# Patient Record
Sex: MALE | Race: WHITE | NOT HISPANIC OR LATINO | Employment: OTHER | URBAN - METROPOLITAN AREA
[De-identification: names, ages, dates, MRNs, and addresses within clinical notes are randomized per-mention and may not be internally consistent; named-entity substitution may affect disease eponyms.]

---

## 2017-06-27 ENCOUNTER — HOSPITAL ENCOUNTER (OUTPATIENT)
Dept: FAMILY MEDICINE CLINIC | Facility: CLINIC | Age: 67
Setting detail: SPECIMEN
Discharge: HOME | End: 2017-06-27
Attending: FAMILY MEDICINE | Admitting: FAMILY MEDICINE

## 2017-06-27 LAB
ALBUMIN SERPL-MCNC: 4.2 G/DL (ref 3.5–4.8)
ALBUMIN/GLOB SERPL: 1.4 {RATIO} (ref 1–1.7)
ALP SERPL-CCNC: 51 IU/L (ref 32–91)
ALT SERPL-CCNC: 21 IU/L (ref 17–63)
ANION GAP SERPL CALC-SCNC: 14.2 MMOL/L (ref 10–20)
AST SERPL-CCNC: 25 IU/L (ref 15–41)
BASOPHILS # BLD AUTO: 0 10*3/UL (ref 0–0.2)
BASOPHILS NFR BLD AUTO: 1 % (ref 0–2)
BILIRUB SERPL-MCNC: 1.1 MG/DL (ref 0.3–1.2)
BUN SERPL-MCNC: 18 MG/DL (ref 8–20)
BUN/CREAT SERPL: 18 (ref 6.2–20.3)
CALCIUM SERPL-MCNC: 9.6 MG/DL (ref 8.9–10.3)
CHLORIDE SERPL-SCNC: 103 MMOL/L (ref 101–111)
CHOLEST SERPL-MCNC: 172 MG/DL
CHOLEST/HDLC SERPL: 3.2 {RATIO}
CONV CO2: 28 MMOL/L (ref 22–32)
CONV LDL CHOLESTEROL DIRECT: 103 MG/DL (ref 0–100)
CONV TOTAL PROTEIN: 7.3 G/DL (ref 6.1–7.9)
CREAT UR-MCNC: 1 MG/DL (ref 0.7–1.2)
DIFFERENTIAL METHOD BLD: (no result)
EOSINOPHIL # BLD AUTO: 0.3 10*3/UL (ref 0–0.3)
EOSINOPHIL # BLD AUTO: 4 % (ref 0–3)
ERYTHROCYTE [DISTWIDTH] IN BLOOD BY AUTOMATED COUNT: 13 % (ref 11.5–14.5)
GLOBULIN UR ELPH-MCNC: 3.1 G/DL (ref 2.5–3.8)
GLUCOSE SERPL-MCNC: 136 MG/DL (ref 65–99)
HCT VFR BLD AUTO: 44.7 % (ref 40–54)
HDLC SERPL-MCNC: 53 MG/DL
HGB BLD-MCNC: 15.3 G/DL (ref 14–18)
LDLC/HDLC SERPL: 1.9 {RATIO}
LIPID INTERPRETATION: ABNORMAL
LYMPHOCYTES # BLD AUTO: 1.7 10*3/UL (ref 0.8–4.8)
LYMPHOCYTES NFR BLD AUTO: 26 % (ref 18–42)
MCH RBC QN AUTO: 32.2 PG (ref 26–32)
MCHC RBC AUTO-ENTMCNC: 34.3 G/DL (ref 32–36)
MCV RBC AUTO: 93.9 FL (ref 80–94)
MONOCYTES # BLD AUTO: 0.8 10*3/UL (ref 0.1–1.3)
MONOCYTES NFR BLD AUTO: 11 % (ref 2–11)
NEUTROPHILS # BLD AUTO: 3.9 10*3/UL (ref 2.3–8.6)
NEUTROPHILS NFR BLD AUTO: 58 % (ref 50–75)
NRBC BLD AUTO-RTO: 0 /100{WBCS}
NRBC/RBC NFR BLD MANUAL: 0 10*3/UL
PLATELET # BLD AUTO: 180 10*3/UL (ref 150–450)
PMV BLD AUTO: 9.4 FL (ref 7.4–10.4)
POTASSIUM SERPL-SCNC: 4.2 MMOL/L (ref 3.6–5.1)
RBC # BLD AUTO: 4.76 10*6/UL (ref 4.6–6)
SODIUM SERPL-SCNC: 141 MMOL/L (ref 136–144)
TRIGL SERPL-MCNC: 84 MG/DL
URATE SERPL-MCNC: 6.3 MG/DL (ref 4.8–8.7)
VLDLC SERPL CALC-MCNC: 15.4 MG/DL
WBC # BLD AUTO: 6.8 10*3/UL (ref 4.5–11.5)

## 2018-07-05 ENCOUNTER — HOSPITAL ENCOUNTER (OUTPATIENT)
Dept: FAMILY MEDICINE CLINIC | Facility: CLINIC | Age: 68
Setting detail: SPECIMEN
Discharge: HOME | End: 2018-07-05
Attending: FAMILY MEDICINE | Admitting: FAMILY MEDICINE

## 2018-07-05 LAB
ALBUMIN SERPL-MCNC: 4.2 G/DL (ref 3.5–4.8)
ALBUMIN/GLOB SERPL: 1.4 {RATIO} (ref 1–1.7)
ALP SERPL-CCNC: 62 IU/L (ref 32–91)
ALT SERPL-CCNC: 19 IU/L (ref 17–63)
ANION GAP SERPL CALC-SCNC: 11.2 MMOL/L (ref 10–20)
AST SERPL-CCNC: 25 IU/L (ref 15–41)
BASOPHILS # BLD AUTO: 0 10*3/UL (ref 0–0.2)
BASOPHILS NFR BLD AUTO: 1 % (ref 0–2)
BILIRUB SERPL-MCNC: 1 MG/DL (ref 0.3–1.2)
BUN SERPL-MCNC: 14 MG/DL (ref 8–20)
BUN/CREAT SERPL: 14 (ref 6.2–20.3)
CALCIUM SERPL-MCNC: 9.2 MG/DL (ref 8.9–10.3)
CHLORIDE SERPL-SCNC: 103 MMOL/L (ref 101–111)
CHOLEST SERPL-MCNC: 161 MG/DL
CHOLEST/HDLC SERPL: 3.2 {RATIO}
CONV CO2: 25 MMOL/L (ref 22–32)
CONV LDL CHOLESTEROL DIRECT: 94 MG/DL (ref 0–100)
CONV TOTAL PROTEIN: 7.3 G/DL (ref 6.1–7.9)
CREAT UR-MCNC: 1 MG/DL (ref 0.7–1.2)
DIFFERENTIAL METHOD BLD: (no result)
EOSINOPHIL # BLD AUTO: 0.3 10*3/UL (ref 0–0.3)
EOSINOPHIL # BLD AUTO: 5 % (ref 0–3)
ERYTHROCYTE [DISTWIDTH] IN BLOOD BY AUTOMATED COUNT: 13.2 % (ref 11.5–14.5)
GLOBULIN UR ELPH-MCNC: 3.1 G/DL (ref 2.5–3.8)
GLUCOSE SERPL-MCNC: 186 MG/DL (ref 65–99)
HCT VFR BLD AUTO: 44.9 % (ref 40–54)
HDLC SERPL-MCNC: 50 MG/DL
HGB BLD-MCNC: 15.3 G/DL (ref 14–18)
LDLC/HDLC SERPL: 1.9 {RATIO}
LIPID INTERPRETATION: NORMAL
LYMPHOCYTES # BLD AUTO: 1.3 10*3/UL (ref 0.8–4.8)
LYMPHOCYTES NFR BLD AUTO: 22 % (ref 18–42)
MCH RBC QN AUTO: 31.9 PG (ref 26–32)
MCHC RBC AUTO-ENTMCNC: 34 G/DL (ref 32–36)
MCV RBC AUTO: 93.9 FL (ref 80–94)
MONOCYTES # BLD AUTO: 0.6 10*3/UL (ref 0.1–1.3)
MONOCYTES NFR BLD AUTO: 10 % (ref 2–11)
NEUTROPHILS # BLD AUTO: 3.8 10*3/UL (ref 2.3–8.6)
NEUTROPHILS NFR BLD AUTO: 62 % (ref 50–75)
NRBC BLD AUTO-RTO: 0 /100{WBCS}
NRBC/RBC NFR BLD MANUAL: 0 10*3/UL
PLATELET # BLD AUTO: 214 10*3/UL (ref 150–450)
PMV BLD AUTO: 8.9 FL (ref 7.4–10.4)
POTASSIUM SERPL-SCNC: 4.2 MMOL/L (ref 3.6–5.1)
RBC # BLD AUTO: 4.78 10*6/UL (ref 4.6–6)
SODIUM SERPL-SCNC: 135 MMOL/L (ref 136–144)
TRIGL SERPL-MCNC: 83 MG/DL
URATE SERPL-MCNC: 6.4 MG/DL (ref 4.8–8.7)
VLDLC SERPL CALC-MCNC: 16.5 MG/DL
WBC # BLD AUTO: 6 10*3/UL (ref 4.5–11.5)

## 2019-05-07 ENCOUNTER — HOSPITAL ENCOUNTER (OUTPATIENT)
Dept: CARDIOLOGY | Facility: HOSPITAL | Age: 69
Discharge: HOME | End: 2019-05-07
Attending: INTERNAL MEDICINE | Admitting: INTERNAL MEDICINE

## 2019-05-28 ENCOUNTER — CONVERSION ENCOUNTER (OUTPATIENT)
Dept: FAMILY MEDICINE CLINIC | Facility: CLINIC | Age: 69
End: 2019-05-28

## 2019-06-01 ENCOUNTER — TRANSCRIBE ORDERS (OUTPATIENT)
Dept: CARDIOLOGY | Facility: HOSPITAL | Age: 69
End: 2019-06-01

## 2019-06-01 DIAGNOSIS — I25.10 CVD (CARDIOVASCULAR DISEASE): Primary | ICD-10-CM

## 2019-06-01 DIAGNOSIS — Z95.5 HX OF HEART ARTERY STENT: ICD-10-CM

## 2019-06-04 VITALS
SYSTOLIC BLOOD PRESSURE: 145 MMHG | HEART RATE: 53 BPM | HEIGHT: 72 IN | DIASTOLIC BLOOD PRESSURE: 84 MMHG | BODY MASS INDEX: 28.44 KG/M2 | OXYGEN SATURATION: 98 % | WEIGHT: 210 LBS

## 2019-06-06 NOTE — PROGRESS NOTES
Visit Type:  New Problem  Referring Provider:  Tabitha Brown MD  Primary Provider:  Kevin LITTLE MD      History of Present Illness:  c/o feeling depressed-on and off for many years.   troubled childhood  was able to be successful and good kids, but times of feeling down and easily frustrated      Past Medical History:     Reviewed history from 07/05/2018 and no changes required:        MI-15 yrs ago        Hyperlipidemia        Hyperglycemia        Gout                colonoscopy 2012-nml    Past Surgical History:     Reviewed history from 06/12/2015 and no changes required:        cardiac stent placement 1999        rotator cuff-right        hernia surgery x2        Heart Catherization (2011)    Family History Summary:      Reviewed history Last on 05/07/2019 and no changes required:05/28/2019  Mother - Has Family History of Diabetes - Entered On: 12/11/2017  Mother - Has Family History of Hypertension - Entered On: 12/11/2017    General Comments - FH:  FH Hypertension-mom      Social History:     Reviewed history from 05/07/2019 and no changes required:        Passive Smoke: N        Alcohol Use: Y        Drug Use: N        HIV/High Risk: N        Regular Exercise: Y        Alcohol Use - yes        Drug Use - no        HIV/High Risk - no        Regular Exercise - yes                Smoking History:        Patient is a former smoker.      Active Medications (reviewed today):  PROMETHAZINE HCL 6.25 MG/5ML ORAL SYRUP (PROMETHAZINE HCL) 5ml po q6h prn cough  MULTI VITAMIN/MINERALS ORAL TABLET (MULTIPLE VITAMINS-MINERALS) Take 1 tablet by mouth daily  ASPIRIN EC LO-DOSE 81 MG ORAL TABLET DELAYED RELEASE (ASPIRIN) 1 tab qd  ZOCOR 40 MG ORAL TABLET (SIMVASTATIN) 1 tab qd  ATENOLOL 25 MG ORAL TABLET (ATENOLOL) 1/2 tab qd    Current Allergies (reviewed today):  No known allergies    Current Medications (including medications started today):   ZOLOFT 50 MG ORAL TABLET (SERTRALINE HCL) 1 tab daily  ZYRTEC ALLERGY  10 MG ORAL TABLET (CETIRIZINE HCL) one daily  MULTI VITAMIN/MINERALS ORAL TABLET (MULTIPLE VITAMINS-MINERALS) Take 1 tablet by mouth daily  ASPIRIN EC LO-DOSE 81 MG ORAL TABLET DELAYED RELEASE (ASPIRIN) 1 tab qd  ZOCOR 40 MG ORAL TABLET (SIMVASTATIN) 1 tab qd  ATENOLOL 25 MG ORAL TABLET (ATENOLOL) 1/2 tab qd      Risk Factors:     Smoked Tobacco Use:  Former smoker  Drug use:  no  HIV high-risk behavior:  no  Alcohol use:  yes  Exercise:  yes        Vital Signs:    Patient Profile:    68 Years Old Male  Height:     71.5 inches  Weight:     210 pounds  BMI:        28.88     O2 Sat:     98 %  Pulse rate: 53 / minute  BP Sittin / 84  (left arm)    Cuff size:  regular    Medications: Medications were reviewed with the patient during this visit.  Allergies: Allergies were reviewed with the patient during this visit.  No Known Allergy.        Vitals Entered By: Corina TAVARES (May 28, 2019 10:07 AM)        Blood Pressure:  Today's BP: 145/84 mm Hg    Labwork:   Most Recent Lab Results:   LDL: 94 mg/dL 2018  HbA1c: : 6.1 % 2018        Impression & Recommendations:    Problem # 1:  DEPRESSIVE DISORDER NEC (ICD-311) (NGB03-I74.9)  start meds  d/w pt goals and side effects  20 of 25min spent discussing pt medical concerns and treatment      His updated medication list for this problem includes:     Zoloft 50 Mg Oral Tablet (Sertraline hcl) ..... 1 tab daily      Medications Added to Medication List This Visit:  1)  Zoloft 50 Mg Oral Tablet (Sertraline hcl) .... 1 tab daily  2)  Zyrtec Allergy 10 Mg Oral Tablet (Cetirizine hcl) .... One daily      Patient Instructions:  1)  follow up july for wellness                        Medication Administration    Orders Added:  1)  98282-Krp Vst-Est Level IV [CPT-65884]  ]      Electronically signed by Tabitha Brown MD on 2019 at 10:27 AM  ________________________________________________________________________       Disclaimer: Converted Note message may  not contain all data elements that existed in the legacy source system. Please see [employer] for the original note details.

## 2019-07-17 ENCOUNTER — LAB (OUTPATIENT)
Dept: FAMILY MEDICINE CLINIC | Facility: CLINIC | Age: 69
End: 2019-07-17

## 2019-07-17 ENCOUNTER — OFFICE VISIT (OUTPATIENT)
Dept: FAMILY MEDICINE CLINIC | Facility: CLINIC | Age: 69
End: 2019-07-17

## 2019-07-17 VITALS
HEIGHT: 72 IN | DIASTOLIC BLOOD PRESSURE: 75 MMHG | SYSTOLIC BLOOD PRESSURE: 135 MMHG | BODY MASS INDEX: 28.44 KG/M2 | WEIGHT: 210 LBS | OXYGEN SATURATION: 94 % | HEART RATE: 50 BPM

## 2019-07-17 DIAGNOSIS — E78.5 HYPERLIPIDEMIA, UNSPECIFIED HYPERLIPIDEMIA TYPE: ICD-10-CM

## 2019-07-17 DIAGNOSIS — R73.03 PREDIABETES: Primary | ICD-10-CM

## 2019-07-17 DIAGNOSIS — R73.03 PREDIABETES: ICD-10-CM

## 2019-07-17 DIAGNOSIS — Z00.00 WELL ADULT EXAM: ICD-10-CM

## 2019-07-17 LAB
ALBUMIN SERPL-MCNC: 4.4 G/DL (ref 3.5–4.8)
ALBUMIN/GLOB SERPL: 1.4 G/DL (ref 1–1.7)
ALP SERPL-CCNC: 61 U/L (ref 32–91)
ALT SERPL W P-5'-P-CCNC: 19 U/L (ref 17–63)
ANION GAP SERPL CALCULATED.3IONS-SCNC: 13.8 MMOL/L (ref 5–15)
ARTICHOKE IGE QN: 117 MG/DL (ref 0–100)
AST SERPL-CCNC: 20 U/L (ref 15–41)
BASOPHILS # BLD AUTO: 0.1 10*3/MM3 (ref 0–0.2)
BASOPHILS NFR BLD AUTO: 0.9 % (ref 0–1.5)
BILIRUB SERPL-MCNC: 0.9 MG/DL (ref 0.3–1.2)
BUN BLD-MCNC: 14 MG/DL (ref 8–20)
BUN/CREAT SERPL: 14 (ref 6.2–20.3)
CALCIUM SPEC-SCNC: 9.4 MG/DL (ref 8.9–10.3)
CHLORIDE SERPL-SCNC: 104 MMOL/L (ref 101–111)
CHOLEST SERPL-MCNC: 179 MG/DL
CO2 SERPL-SCNC: 25 MMOL/L (ref 22–32)
CREAT BLD-MCNC: 1 MG/DL (ref 0.7–1.2)
DEPRECATED RDW RBC AUTO: 44.2 FL (ref 37–54)
EOSINOPHIL # BLD AUTO: 0.4 10*3/MM3 (ref 0–0.4)
EOSINOPHIL NFR BLD AUTO: 6.4 % (ref 0.3–6.2)
ERYTHROCYTE [DISTWIDTH] IN BLOOD BY AUTOMATED COUNT: 13.3 % (ref 12.3–15.4)
GFR SERPL CREATININE-BSD FRML MDRD: 74 ML/MIN/1.73
GLOBULIN UR ELPH-MCNC: 3.1 GM/DL (ref 2.5–3.8)
GLUCOSE BLD-MCNC: 140 MG/DL (ref 65–99)
HBA1C MFR BLD: 6.3 % (ref 3.5–5.6)
HCT VFR BLD AUTO: 46.4 % (ref 37.5–51)
HDLC SERPL QL: 3.31
HDLC SERPL-MCNC: 54 MG/DL
HGB BLD-MCNC: 15.9 G/DL (ref 13–17.7)
LDLC/HDLC SERPL: 1.93 {RATIO}
LYMPHOCYTES # BLD AUTO: 2.1 10*3/MM3 (ref 0.7–3.1)
LYMPHOCYTES NFR BLD AUTO: 30.7 % (ref 19.6–45.3)
MCH RBC QN AUTO: 32.5 PG (ref 26.6–33)
MCHC RBC AUTO-ENTMCNC: 34.3 G/DL (ref 31.5–35.7)
MCV RBC AUTO: 94.9 FL (ref 79–97)
MONOCYTES # BLD AUTO: 0.7 10*3/MM3 (ref 0.1–0.9)
MONOCYTES NFR BLD AUTO: 9.7 % (ref 5–12)
NEUTROPHILS # BLD AUTO: 3.5 10*3/MM3 (ref 1.7–7)
NEUTROPHILS NFR BLD AUTO: 52.3 % (ref 42.7–76)
NRBC BLD AUTO-RTO: 0.2 /100 WBC (ref 0–0.2)
PLATELET # BLD AUTO: 224 10*3/MM3 (ref 140–450)
PMV BLD AUTO: 9.1 FL (ref 6–12)
POTASSIUM BLD-SCNC: 4.8 MMOL/L (ref 3.6–5.1)
PROT SERPL-MCNC: 7.5 G/DL (ref 6.1–7.9)
RBC # BLD AUTO: 4.89 10*6/MM3 (ref 4.14–5.8)
SODIUM BLD-SCNC: 138 MMOL/L (ref 136–144)
TRIGL SERPL-MCNC: 103 MG/DL
VLDLC SERPL-MCNC: 20.6 MG/DL
WBC NRBC COR # BLD: 6.7 10*3/MM3 (ref 3.4–10.8)

## 2019-07-17 PROCEDURE — 85025 COMPLETE CBC W/AUTO DIFF WBC: CPT | Performed by: FAMILY MEDICINE

## 2019-07-17 PROCEDURE — 80061 LIPID PANEL: CPT | Performed by: FAMILY MEDICINE

## 2019-07-17 PROCEDURE — 80053 COMPREHEN METABOLIC PANEL: CPT | Performed by: FAMILY MEDICINE

## 2019-07-17 PROCEDURE — 36415 COLL VENOUS BLD VENIPUNCTURE: CPT | Performed by: FAMILY MEDICINE

## 2019-07-17 PROCEDURE — 83036 HEMOGLOBIN GLYCOSYLATED A1C: CPT | Performed by: FAMILY MEDICINE

## 2019-07-17 PROCEDURE — 99213 OFFICE O/P EST LOW 20 MIN: CPT | Performed by: FAMILY MEDICINE

## 2019-07-17 RX ORDER — SIMVASTATIN 40 MG
40 TABLET ORAL NIGHTLY
Qty: 90 TABLET | Refills: 3 | Status: SHIPPED | OUTPATIENT
Start: 2019-07-17 | End: 2020-07-27 | Stop reason: SDUPTHER

## 2019-07-17 RX ORDER — ASPIRIN 81 MG/1
81 TABLET ORAL DAILY
COMMUNITY
Start: 2015-01-22

## 2019-07-17 RX ORDER — SIMVASTATIN 40 MG
TABLET ORAL
COMMUNITY
Start: 2015-01-22 | End: 2019-07-17 | Stop reason: SDUPTHER

## 2019-07-17 RX ORDER — ATENOLOL 25 MG/1
TABLET ORAL
COMMUNITY
Start: 2015-01-22 | End: 2019-07-17 | Stop reason: SDUPTHER

## 2019-07-17 RX ORDER — ATENOLOL 25 MG/1
25 TABLET ORAL DAILY
Qty: 90 TABLET | Refills: 3 | Status: SHIPPED | OUTPATIENT
Start: 2019-07-17 | End: 2020-07-27 | Stop reason: SDUPTHER

## 2019-07-17 RX ORDER — CETIRIZINE HYDROCHLORIDE 10 MG/1
TABLET ORAL EVERY 24 HOURS
COMMUNITY
Start: 2019-05-28

## 2019-07-17 NOTE — PROGRESS NOTES
Subjective   Edi Hager is a 69 y.o. male.     HPI     Here for medication f/u-zoloft-feeling great  Labs    wal porfirio      Past Medical History:     Reviewed history from 07/05/2018 and no changes required:        MI-15 yrs ago        Hyperlipidemia        Hyperglycemia        Gout                colonoscopy 2012-nml    Past Surgical History:     Reviewed history from 06/12/2015 and no changes required:        cardiac stent placement 1999        rotator cuff-right        hernia surgery x2        Heart Catherization (2011)      Past Medical History:   Diagnosis Date   • Gout    • Hx of colonoscopy 2012    NML   • Hyperglycemia    • Hyperlipidemia    • MI (myocardial infarction) (CMS/HCC)     15 years ago      Past Surgical History:   Procedure Laterality Date   • CARDIAC CATHETERIZATION  20111   • CORONARY ANGIOPLASTY WITH STENT PLACEMENT  1999   • HERNIA REPAIR      Hernia surgery (Two surgery)   • ROTATOR CUFF REPAIR Right      Family History   Problem Relation Age of Onset   • Hypertension Mother    • Diabetes Mother      Social History     Tobacco Use   • Smoking status: Former Smoker   • Smokeless tobacco: Never Used   Substance Use Topics   • Alcohol use: Yes       Current Outpatient Medications on File Prior to Visit   Medication Sig Dispense Refill   • aspirin (ASPIR) 81 MG EC tablet ASPIRIN EC LO-DOSE 81 MG ORAL TABLET DELAYED RELEASE     • cetirizine (ZYRTEC ALLERGY) 10 MG tablet Daily.     • Multiple Vitamins-Minerals (MULTI VITAMIN/MINERALS) tablet MULTI VITAMIN/MINERALS TABS     • [DISCONTINUED] atenolol (TENORMIN) 25 MG tablet ATENOLOL 25 MG TABS     • [DISCONTINUED] sertraline (ZOLOFT) 50 MG tablet Daily.     • [DISCONTINUED] simvastatin (ZOCOR) 40 MG tablet ZOCOR 40 MG TABS       No current facility-administered medications on file prior to visit.            Review of Systems   Respiratory: Negative for shortness of breath.    Cardiovascular: Negative for chest pain.   Gastrointestinal:  "Negative for constipation and diarrhea.   Genitourinary: Negative for dysuria.       Visit Vitals  /75 (BP Location: Left arm, Patient Position: Sitting, Cuff Size: Adult)   Pulse 50   Ht 182.9 cm (72\")   Wt 95.3 kg (210 lb)   SpO2 94%   BMI 28.48 kg/m²       Objective   Physical Exam   Constitutional: He is oriented to person, place, and time. He appears well-developed and well-nourished.   HENT:   Head: Normocephalic and atraumatic.   Cardiovascular: Normal rate, regular rhythm and normal heart sounds.   Pulmonary/Chest: Effort normal and breath sounds normal.   Neurological: He is alert and oriented to person, place, and time.   Psychiatric: He has a normal mood and affect. His behavior is normal. Judgment and thought content normal.   Vitals reviewed.        Diagnoses and all orders for this visit:    1. Prediabetes (Primary)  -     CBC & Differential  -     Comprehensive Metabolic Panel; Future  -     Hemoglobin A1c; Future  -     Lipid Panel; Future    2. Well adult exam  -     CBC & Differential  -     Comprehensive Metabolic Panel; Future  -     Hemoglobin A1c; Future  -     Lipid Panel; Future    3. Hyperlipidemia, unspecified hyperlipidemia type   -     Lipid Panel; Future    Other orders  -     atenolol (TENORMIN) 25 MG tablet; Take 1 tablet by mouth Daily.  Dispense: 90 tablet; Refill: 3  -     sertraline (ZOLOFT) 50 MG tablet; Take 1 tablet by mouth Daily.  Dispense: 90 tablet; Refill: 3  -     simvastatin (ZOCOR) 40 MG tablet; Take 1 tablet by mouth Every Night.  Dispense: 90 tablet; Refill: 3      "

## 2019-11-05 ENCOUNTER — APPOINTMENT (OUTPATIENT)
Dept: CARDIOLOGY | Facility: HOSPITAL | Age: 69
End: 2019-11-05

## 2020-01-24 ENCOUNTER — OFFICE VISIT (OUTPATIENT)
Dept: FAMILY MEDICINE CLINIC | Facility: CLINIC | Age: 70
End: 2020-01-24

## 2020-01-24 VITALS
WEIGHT: 212 LBS | TEMPERATURE: 98.1 F | OXYGEN SATURATION: 98 % | DIASTOLIC BLOOD PRESSURE: 72 MMHG | SYSTOLIC BLOOD PRESSURE: 123 MMHG | BODY MASS INDEX: 28.75 KG/M2 | HEART RATE: 54 BPM

## 2020-01-24 DIAGNOSIS — J40 BRONCHITIS: Primary | ICD-10-CM

## 2020-01-24 PROCEDURE — 99213 OFFICE O/P EST LOW 20 MIN: CPT | Performed by: NURSE PRACTITIONER

## 2020-01-24 RX ORDER — METHYLPREDNISOLONE 4 MG/1
TABLET ORAL
Qty: 21 TABLET | Refills: 0 | Status: SHIPPED | OUTPATIENT
Start: 2020-01-24 | End: 2020-02-28

## 2020-01-24 RX ORDER — AMOXICILLIN AND CLAVULANATE POTASSIUM 875; 125 MG/1; MG/1
1 TABLET, FILM COATED ORAL 2 TIMES DAILY
Qty: 20 TABLET | Refills: 0 | Status: SHIPPED | OUTPATIENT
Start: 2020-01-24 | End: 2020-02-03

## 2020-01-24 NOTE — PROGRESS NOTES
Subjective   Edi Hager is a 69 y.o. male.       HPI   Pt. Is  Here today with c/o a URI.  Symptoms started about a week ago.  He has noticed some tightness in his chest with wheezes; increase with physical activity.  Mild congestion. Denies any sore throat, ear pain or fevers.  Hasn't taken anything otc.  No known ill contacts.    Denies any chest pain or palpitations.     The following portions of the patient's history were reviewed and updated as appropriate: allergies, current medications, past family history, past medical history, past social history, past surgical history and problem list.    Review of Systems   Constitutional: Negative for activity change, appetite change, chills, diaphoresis, fatigue, fever, unexpected weight gain and unexpected weight loss.   HENT: Positive for congestion. Negative for ear discharge, ear pain, postnasal drip, rhinorrhea, sinus pressure, sore throat, swollen glands and trouble swallowing.    Eyes: Negative for pain, discharge, redness and itching.   Respiratory: Positive for cough, shortness of breath and wheezing.    Cardiovascular: Negative for chest pain, palpitations and leg swelling.   Gastrointestinal: Negative for constipation, diarrhea, nausea and vomiting.   Musculoskeletal: Negative for arthralgias and myalgias.   Skin: Negative for rash.   Neurological: Negative for dizziness and headache.   Psychiatric/Behavioral: Negative for depressed mood. The patient is not nervous/anxious.        Objective   Physical Exam   Constitutional: He is oriented to person, place, and time. He appears well-developed and well-nourished. No distress.   HENT:   Head: Normocephalic and atraumatic.   Right Ear: Hearing, tympanic membrane, external ear and ear canal normal.   Left Ear: Hearing, tympanic membrane, external ear and ear canal normal.   Nose: Rhinorrhea present. Right sinus exhibits no maxillary sinus tenderness and no frontal sinus tenderness. Left sinus exhibits no  maxillary sinus tenderness and no frontal sinus tenderness.   Mouth/Throat: Uvula is midline, oropharynx is clear and moist and mucous membranes are normal.   Eyes: Pupils are equal, round, and reactive to light. Conjunctivae and EOM are normal. Right eye exhibits no discharge. Left eye exhibits no discharge.   Neck: Normal range of motion. Neck supple.   Cardiovascular: Normal rate, regular rhythm, normal heart sounds and intact distal pulses.   No murmur heard.  Pulmonary/Chest: Effort normal. No respiratory distress. He has wheezes (a few scattered expiratory wheezes throughout; clears well with cough. ).   Abdominal: Soft. Bowel sounds are normal. He exhibits no distension. There is no tenderness.   Musculoskeletal: He exhibits no edema.   Lymphadenopathy:     He has no cervical adenopathy.   Neurological: He is alert and oriented to person, place, and time.   Skin: Skin is warm and dry. No rash noted. He is not diaphoretic.   Psychiatric: He has a normal mood and affect.   Vitals reviewed.        Assessment/Plan   Edi was seen today for uri.    Diagnoses and all orders for this visit:    Bronchitis  Comments:  Given Augmentin and medrol pack  Increase fluids and rest  Call for worsening    Orders:  -     methylPREDNISolone (MEDROL, JO ANN,) 4 MG tablet; Take as directed on package instructions.  -     amoxicillin-clavulanate (AUGMENTIN) 875-125 MG per tablet; Take 1 tablet by mouth 2 (Two) Times a Day for 10 days.

## 2020-02-28 ENCOUNTER — OFFICE VISIT (OUTPATIENT)
Dept: FAMILY MEDICINE CLINIC | Facility: CLINIC | Age: 70
End: 2020-02-28

## 2020-02-28 VITALS
OXYGEN SATURATION: 98 % | BODY MASS INDEX: 28.62 KG/M2 | HEART RATE: 65 BPM | SYSTOLIC BLOOD PRESSURE: 148 MMHG | DIASTOLIC BLOOD PRESSURE: 87 MMHG | WEIGHT: 211 LBS | TEMPERATURE: 97.7 F

## 2020-02-28 DIAGNOSIS — J06.9 ACUTE URI: Primary | ICD-10-CM

## 2020-02-28 PROCEDURE — 99213 OFFICE O/P EST LOW 20 MIN: CPT | Performed by: NURSE PRACTITIONER

## 2020-02-28 RX ORDER — CEFDINIR 300 MG/1
300 CAPSULE ORAL 2 TIMES DAILY
Qty: 20 CAPSULE | Refills: 0 | Status: SHIPPED | OUTPATIENT
Start: 2020-02-28 | End: 2020-03-09

## 2020-02-28 NOTE — PROGRESS NOTES
Subjective   Edi Hgaer is a 69 y.o. male.       HPI   Pt. is here today with concerns of a URI.  Pt. seen on 1/24 for bronchitis; treated with augmentin and medrol pack.  He feels symptoms completely resolved.  He started to notice some symptoms returning earlier this week.  He has felt nasal and chest congestion.  Some wheezes but not currently.  No fevers.  No ear pain or sore throat.  Grandchild has recently been sick.  He has cetrizine at home but doesn't use it regularly right now.     The following portions of the patient's history were reviewed and updated as appropriate: allergies, current medications, past family history, past medical history, past social history, past surgical history and problem list.    Review of Systems   Constitutional: Negative for activity change, appetite change, chills, diaphoresis, fatigue, fever, unexpected weight gain and unexpected weight loss.   HENT: Positive for congestion and postnasal drip. Negative for ear discharge, ear pain, rhinorrhea, sinus pressure, sore throat, swollen glands and trouble swallowing.    Eyes: Negative for pain, discharge, redness and itching.   Respiratory: Positive for cough. Negative for chest tightness, shortness of breath and wheezing.    Cardiovascular: Negative for chest pain, palpitations and leg swelling.   Gastrointestinal: Negative for diarrhea, nausea and vomiting.   Musculoskeletal: Negative for arthralgias and myalgias.   Skin: Negative for rash and skin lesions.   Neurological: Negative for dizziness, light-headedness, headache and confusion.   Hematological: Negative for adenopathy.   Psychiatric/Behavioral: Negative for depressed mood. The patient is not nervous/anxious.        Objective   Physical Exam   Constitutional: He is oriented to person, place, and time. He appears well-developed and well-nourished. No distress.   HENT:   Head: Normocephalic and atraumatic.   Right Ear: Hearing, tympanic membrane, external ear and ear  canal normal.   Left Ear: Hearing, tympanic membrane, external ear and ear canal normal.   Nose: Rhinorrhea present. Right sinus exhibits no maxillary sinus tenderness and no frontal sinus tenderness. Left sinus exhibits no maxillary sinus tenderness and no frontal sinus tenderness.   Mouth/Throat: Uvula is midline and mucous membranes are normal. Posterior oropharyngeal erythema present. No oropharyngeal exudate, posterior oropharyngeal edema or tonsillar abscesses.   Eyes: Pupils are equal, round, and reactive to light. Conjunctivae and EOM are normal. Right eye exhibits no discharge. Left eye exhibits no discharge.   Neck: Normal range of motion. Neck supple.   Cardiovascular: Normal rate, regular rhythm, normal heart sounds and intact distal pulses.   No murmur heard.  Pulmonary/Chest: Effort normal and breath sounds normal. No respiratory distress. He has no wheezes. He exhibits no tenderness.   Abdominal: Soft. Bowel sounds are normal. He exhibits no distension. There is no tenderness.   Lymphadenopathy:     He has no cervical adenopathy.   Neurological: He is alert and oriented to person, place, and time.   Skin: Skin is warm and dry. No rash noted. No erythema.   Psychiatric: He has a normal mood and affect.   Vitals reviewed.        Assessment/Plan   Edi was seen today for bronchitis.    Diagnoses and all orders for this visit:    Acute URI  Comments:  Given cefdinir  Start certrizine and flonase daily  Increase fluids and rest  Call for worsening  Orders:  -     cefdinir (OMNICEF) 300 MG capsule; Take 1 capsule by mouth 2 (Two) Times a Day for 10 days.

## 2020-05-14 ENCOUNTER — HOSPITAL ENCOUNTER (OUTPATIENT)
Dept: CARDIOLOGY | Facility: HOSPITAL | Age: 70
Discharge: HOME | End: 2020-05-14

## 2020-05-14 ENCOUNTER — OFFICE VISIT (OUTPATIENT)
Dept: CARDIOLOGY | Facility: CLINIC | Age: 70
End: 2020-05-14

## 2020-05-14 VITALS
HEIGHT: 72 IN | BODY MASS INDEX: 28.58 KG/M2 | DIASTOLIC BLOOD PRESSURE: 80 MMHG | SYSTOLIC BLOOD PRESSURE: 140 MMHG | WEIGHT: 211 LBS | HEART RATE: 52 BPM

## 2020-05-14 DIAGNOSIS — I20.9 ANGINA, CLASS II (HCC): ICD-10-CM

## 2020-05-14 DIAGNOSIS — I25.10 CVD (CARDIOVASCULAR DISEASE): ICD-10-CM

## 2020-05-14 DIAGNOSIS — R94.39 ABNORMAL NUCLEAR STRESS TEST: Primary | ICD-10-CM

## 2020-05-14 DIAGNOSIS — Z95.820 STATUS POST ANGIOPLASTY WITH STENT: ICD-10-CM

## 2020-05-14 DIAGNOSIS — Z95.5 HX OF HEART ARTERY STENT: ICD-10-CM

## 2020-05-14 LAB
BH CV STRESS BP STAGE 1: NORMAL
BH CV STRESS BP STAGE 2: NORMAL
BH CV STRESS BP STAGE 3: NORMAL
BH CV STRESS DURATION MIN STAGE 1: 3
BH CV STRESS DURATION MIN STAGE 2: 3
BH CV STRESS DURATION MIN STAGE 3: 1
BH CV STRESS DURATION SEC STAGE 1: 0
BH CV STRESS DURATION SEC STAGE 2: 0
BH CV STRESS DURATION SEC STAGE 3: 38
BH CV STRESS GRADE STAGE 1: 10
BH CV STRESS GRADE STAGE 2: 12
BH CV STRESS GRADE STAGE 3: 14
BH CV STRESS HR STAGE 1: 73
BH CV STRESS HR STAGE 2: 82
BH CV STRESS HR STAGE 3: 112
BH CV STRESS METS STAGE 1: 5
BH CV STRESS METS STAGE 2: 7.5
BH CV STRESS METS STAGE 3: 10
BH CV STRESS PROTOCOL 1: NORMAL
BH CV STRESS RECOVERY BP: NORMAL MMHG
BH CV STRESS RECOVERY HR: 63 BPM
BH CV STRESS SPEED STAGE 1: 1.7
BH CV STRESS SPEED STAGE 2: 2.5
BH CV STRESS SPEED STAGE 3: 3.4
BH CV STRESS STAGE 1: 1
BH CV STRESS STAGE 2: 2
BH CV STRESS STAGE 3: 3
MAXIMAL PREDICTED HEART RATE: 152 BPM
PERCENT MAX PREDICTED HR: 73.68 %
STRESS BASELINE BP: NORMAL MMHG
STRESS BASELINE HR: 52 BPM
STRESS PERCENT HR: 87 %
STRESS POST ESTIMATED WORKLOAD: 9.1 METS
STRESS POST EXERCISE DUR MIN: 7 MIN
STRESS POST EXERCISE DUR SEC: 38 SEC
STRESS POST PEAK BP: NORMAL MMHG
STRESS POST PEAK HR: 112 BPM
STRESS TARGET HR: 129 BPM

## 2020-05-14 PROCEDURE — 93017 CV STRESS TEST TRACING ONLY: CPT

## 2020-05-14 PROCEDURE — 93016 CV STRESS TEST SUPVJ ONLY: CPT | Performed by: INTERNAL MEDICINE

## 2020-05-14 PROCEDURE — 0 TECHNETIUM SESTAMIBI: Performed by: INTERNAL MEDICINE

## 2020-05-14 PROCEDURE — 78452 HT MUSCLE IMAGE SPECT MULT: CPT | Performed by: INTERNAL MEDICINE

## 2020-05-14 PROCEDURE — A9500 TC99M SESTAMIBI: HCPCS | Performed by: INTERNAL MEDICINE

## 2020-05-14 PROCEDURE — 99215 OFFICE O/P EST HI 40 MIN: CPT | Performed by: INTERNAL MEDICINE

## 2020-05-14 PROCEDURE — 78452 HT MUSCLE IMAGE SPECT MULT: CPT

## 2020-05-14 PROCEDURE — 93018 CV STRESS TEST I&R ONLY: CPT | Performed by: INTERNAL MEDICINE

## 2020-05-14 RX ORDER — ASCORBIC ACID 500 MG
500 TABLET ORAL DAILY
COMMUNITY

## 2020-05-14 RX ADMIN — TECHNETIUM TC 99M SESTAMIBI 1 DOSE: 1 INJECTION INTRAVENOUS at 09:30

## 2020-05-14 NOTE — PROGRESS NOTES
Encounter Date:05/14/2020  Last seen 5/7/2019      Patient ID: Edi Hager is a 69 y.o. male.    Chief Complaint:  Chest discomfort-new problem  Status post myocardial infarction  Status post stent placement      History of Present Illness  Patient was last seen 5/7/2019.  For last 3 to 4 months patient has been having predictably exertional substernal heaviness and tightness and burning sensation with activity and no radiation of the discomfort into the neck or into the arms.  It is not associated with any sweating shortness of breath palpitations dizziness or syncope.  Denies having any nausea or vomiting.    Since I have last seen, the patient has been without any shortness of breath, palpitations, dizziness or syncope.  Denies having any headache ,abdominal pain ,nausea, vomiting , diarrhea constipation, loss of weight or loss of appetite.  Denies having any excessive bruising ,hematuria or blood in the stool.    Review of all systems negative except as indicated    Assessment and Plan       ////////////////////////  Impression  ===========  -Chest discomfort suggestive of angina pectoris.  Stress Cardiolite test 5/14/2020 revealed significant inferior apical and posterior lateral ischemia with reproducible chest discomfort with exercise     - status post myocardial infarction  1999 and stent placement to circumflex coronary artery in 1999 (in [place]) .   last cardiac catheterization 2011 revealed patent circumflex stent.  Totally occluded mid LAD with collateral filling of the distal LAD     Stress Cardiolite test showed predictable  apical and distal inferior ischemic changes with excellent exercise tolerance.  May 7, 2019      -dyslipidemia      -history of the elevated blood sugars with normal hemoglobin A1c      -status post rotator cuff surgery and double hernia repair      -former smoker  ===========   Plan  =============  Recently patient has been having increased chest discomfort suggestive of  angina pectoris-exertional  Stress Cardiolite test-abnormal as above and had reproducible chest discomfort with exercise.  Medications are reviewed and updated.  Patient was advised cardiac catheterization and coronary arteriography for definite evaluation of coronary artery status.  Risks and benefits pros and cons of the procedure were discussed with patient.  Further plan will depend on patient's progress.  ////////////////////////            Diagnosis Plan   1. Abnormal nuclear stress test  COVID PRE-OP / PRE-PROCEDURE SCREENING ORDER - Swab, Nasopharynx    Case Request Cath Lab: Left Heart Cath with Coronary Angiography    CBC (No Diff)    Basic Metabolic Panel    Protime-INR    Lipid Panel    ECG 12 Lead    XR Chest 2 View   2. Angina, class II (CMS/HCC)  COVID PRE-OP / PRE-PROCEDURE SCREENING ORDER - Swab, Nasopharynx    Case Request Cath Lab: Left Heart Cath with Coronary Angiography    CBC (No Diff)    Basic Metabolic Panel    Protime-INR    Lipid Panel    ECG 12 Lead    XR Chest 2 View   3. Status post angioplasty with stent  COVID PRE-OP / PRE-PROCEDURE SCREENING ORDER - Swab, Nasopharynx    Case Request Cath Lab: Left Heart Cath with Coronary Angiography    CBC (No Diff)    Basic Metabolic Panel    Protime-INR    Lipid Panel    ECG 12 Lead    XR Chest 2 View   LAB RESULTS (LAST 7 DAYS)    CBC        BMP        CMP         BNP        TROPONIN        CoAg        Creatinine Clearance  CrCl cannot be calculated (Patient's most recent lab result is older than the maximum 30 days allowed.).    ABG        Radiology  No radiology results for the last day                The following portions of the patient's history were reviewed and updated as appropriate: allergies, current medications, past family history, past medical history, past social history, past surgical history and problem list.    Review of Systems   Constitution: Negative for malaise/fatigue.   Cardiovascular: Negative for chest pain, leg swelling,  palpitations and syncope.   Respiratory: Negative for shortness of breath.    Skin: Negative for rash.   Gastrointestinal: Negative for nausea and vomiting.   Neurological: Negative for dizziness, light-headedness and numbness.         Current Outpatient Medications:   •  aspirin (ASPIR) 81 MG EC tablet, ASPIRIN EC LO-DOSE 81 MG ORAL TABLET DELAYED RELEASE, Disp: , Rfl:   •  atenolol (TENORMIN) 25 MG tablet, Take 1 tablet by mouth Daily., Disp: 90 tablet, Rfl: 3  •  cetirizine (ZYRTEC ALLERGY) 10 MG tablet, Daily., Disp: , Rfl:   •  Multiple Vitamins-Minerals (MULTI VITAMIN/MINERALS) tablet, MULTI VITAMIN/MINERALS TABS, Disp: , Rfl:   •  sertraline (ZOLOFT) 50 MG tablet, Take 1 tablet by mouth Daily., Disp: 90 tablet, Rfl: 3  •  simvastatin (ZOCOR) 40 MG tablet, Take 1 tablet by mouth Every Night., Disp: 90 tablet, Rfl: 3  No current facility-administered medications for this visit.     No Known Allergies    Family History   Problem Relation Age of Onset   • Hypertension Mother    • Diabetes Mother        Past Surgical History:   Procedure Laterality Date   • CARDIAC CATHETERIZATION  20111   • CORONARY ANGIOPLASTY WITH STENT PLACEMENT  1999   • HERNIA REPAIR      Hernia surgery (Two surgery)   • ROTATOR CUFF REPAIR Right        Past Medical History:   Diagnosis Date   • Gout    • Hx of colonoscopy 2012    NML   • Hyperglycemia    • Hyperlipidemia    • MI (myocardial infarction) (CMS/[facility])     15 years ago        Family History   Problem Relation Age of Onset   • Hypertension Mother    • Diabetes Mother        Social History     Socioeconomic History   • Marital status:      Spouse name: Not on file   • Number of children: Not on file   • Years of education: Not on file   • Highest education level: Not on file   Tobacco Use   • Smoking status: Former Smoker   • Smokeless tobacco: Never Used   Substance and Sexual Activity   • Alcohol use: Yes   • Drug use: No   • Sexual activity: Defer  "        Procedures      Objective:       Physical Exam    /80   Pulse 52   Ht 182.9 cm (72\")   Wt 95.7 kg (211 lb)   BMI 28.62 kg/m²   The patient is alert, oriented and in no distress.    Vital signs as noted above.    Head and neck revealed no carotid bruits or jugular venous distension.  No thyromegaly or lymphadenopathy is present.    Lungs clear.  No wheezing.  Breath sounds are normal bilaterally.    Heart normal first and second heart sounds.  No murmur..  No pericardial rub is present.  No gallop is present.    Abdomen soft and nontender.  No organomegaly is present.    Extremities revealed good peripheral pulses without any pedal edema.    Skin warm and dry.    Musculoskeletal system is grossly normal.    CNS grossly normal.        "

## 2020-05-15 ENCOUNTER — LAB (OUTPATIENT)
Dept: LAB | Facility: HOSPITAL | Age: 70
End: 2020-05-15

## 2020-05-15 DIAGNOSIS — Z95.820 STATUS POST ANGIOPLASTY WITH STENT: ICD-10-CM

## 2020-05-15 DIAGNOSIS — I20.9 ANGINA, CLASS II (HCC): ICD-10-CM

## 2020-05-15 DIAGNOSIS — R94.39 ABNORMAL NUCLEAR STRESS TEST: ICD-10-CM

## 2020-05-15 LAB
ANION GAP SERPL CALCULATED.3IONS-SCNC: 12.8 MMOL/L (ref 5–15)
BUN BLD-MCNC: 13 MG/DL (ref 8–23)
BUN/CREAT SERPL: 14.9 (ref 7–25)
CALCIUM SPEC-SCNC: 9.3 MG/DL (ref 8.6–10.5)
CHLORIDE SERPL-SCNC: 98 MMOL/L (ref 98–107)
CHOLEST SERPL-MCNC: 199 MG/DL (ref 0–200)
CO2 SERPL-SCNC: 28.2 MMOL/L (ref 22–29)
CREAT BLD-MCNC: 0.87 MG/DL (ref 0.76–1.27)
DEPRECATED RDW RBC AUTO: 42.5 FL (ref 37–54)
ERYTHROCYTE [DISTWIDTH] IN BLOOD BY AUTOMATED COUNT: 12.4 % (ref 12.3–15.4)
GFR SERPL CREATININE-BSD FRML MDRD: 87 ML/MIN/1.73
GLUCOSE BLD-MCNC: 227 MG/DL (ref 65–99)
HCT VFR BLD AUTO: 44.2 % (ref 37.5–51)
HDLC SERPL-MCNC: 50 MG/DL (ref 40–60)
HGB BLD-MCNC: 15.3 G/DL (ref 13–17.7)
INR PPP: 0.96 (ref 0.9–1.1)
LDLC SERPL CALC-MCNC: 125 MG/DL (ref 0–100)
LDLC/HDLC SERPL: 2.5 {RATIO}
MCH RBC QN AUTO: 32.6 PG (ref 26.6–33)
MCHC RBC AUTO-ENTMCNC: 34.6 G/DL (ref 31.5–35.7)
MCV RBC AUTO: 94.2 FL (ref 79–97)
PLATELET # BLD AUTO: 226 10*3/MM3 (ref 140–450)
PMV BLD AUTO: 10.9 FL (ref 6–12)
POTASSIUM BLD-SCNC: 4.4 MMOL/L (ref 3.5–5.2)
PROTHROMBIN TIME: 10.1 SECONDS (ref 9.6–11.7)
RBC # BLD AUTO: 4.69 10*6/MM3 (ref 4.14–5.8)
SODIUM BLD-SCNC: 139 MMOL/L (ref 136–145)
TRIGL SERPL-MCNC: 120 MG/DL (ref 0–150)
VLDLC SERPL-MCNC: 24 MG/DL (ref 5–40)
WBC NRBC COR # BLD: 6.59 10*3/MM3 (ref 3.4–10.8)

## 2020-05-15 PROCEDURE — 85027 COMPLETE CBC AUTOMATED: CPT

## 2020-05-15 PROCEDURE — U0004 COV-19 TEST NON-CDC HGH THRU: HCPCS

## 2020-05-15 PROCEDURE — 36415 COLL VENOUS BLD VENIPUNCTURE: CPT

## 2020-05-15 PROCEDURE — 85610 PROTHROMBIN TIME: CPT

## 2020-05-15 PROCEDURE — 80048 BASIC METABOLIC PNL TOTAL CA: CPT

## 2020-05-15 PROCEDURE — 80061 LIPID PANEL: CPT

## 2020-05-17 LAB
REF LAB TEST METHOD: NORMAL
SARS-COV-2 RNA RESP QL NAA+PROBE: NOT DETECTED

## 2020-05-18 ENCOUNTER — HOSPITAL ENCOUNTER (OUTPATIENT)
Dept: GENERAL RADIOLOGY | Facility: HOSPITAL | Age: 70
Setting detail: HOSPITAL OUTPATIENT SURGERY
Discharge: HOME | End: 2020-05-18

## 2020-05-18 ENCOUNTER — HOSPITAL ENCOUNTER (OUTPATIENT)
Facility: HOSPITAL | Age: 70
Setting detail: HOSPITAL OUTPATIENT SURGERY
Discharge: HOME | End: 2020-05-18
Attending: INTERNAL MEDICINE | Admitting: INTERNAL MEDICINE

## 2020-05-18 VITALS
HEIGHT: 71 IN | TEMPERATURE: 97.8 F | SYSTOLIC BLOOD PRESSURE: 113 MMHG | WEIGHT: 208.78 LBS | DIASTOLIC BLOOD PRESSURE: 67 MMHG | OXYGEN SATURATION: 94 % | BODY MASS INDEX: 29.23 KG/M2 | RESPIRATION RATE: 14 BRPM | HEART RATE: 65 BPM

## 2020-05-18 DIAGNOSIS — Z95.820 STATUS POST ANGIOPLASTY WITH STENT: ICD-10-CM

## 2020-05-18 DIAGNOSIS — I20.9 ANGINA, CLASS II (HCC): ICD-10-CM

## 2020-05-18 DIAGNOSIS — R94.39 ABNORMAL NUCLEAR STRESS TEST: ICD-10-CM

## 2020-05-18 PROBLEM — M67.40 GANGLION CYST: Status: ACTIVE | Noted: 2018-07-05

## 2020-05-18 PROBLEM — F32.A DEPRESSION: Status: ACTIVE | Noted: 2019-05-28

## 2020-05-18 PROCEDURE — 93458 L HRT ARTERY/VENTRICLE ANGIO: CPT | Performed by: INTERNAL MEDICINE

## 2020-05-18 PROCEDURE — 0 IOPAMIDOL PER 1 ML: Performed by: INTERNAL MEDICINE

## 2020-05-18 PROCEDURE — 71046 X-RAY EXAM CHEST 2 VIEWS: CPT

## 2020-05-18 PROCEDURE — 99153 MOD SED SAME PHYS/QHP EA: CPT | Performed by: INTERNAL MEDICINE

## 2020-05-18 PROCEDURE — 25010000002 MIDAZOLAM PER 1 MG: Performed by: INTERNAL MEDICINE

## 2020-05-18 PROCEDURE — 25010000002 FENTANYL CITRATE (PF) 100 MCG/2ML SOLUTION: Performed by: INTERNAL MEDICINE

## 2020-05-18 PROCEDURE — 99152 MOD SED SAME PHYS/QHP 5/>YRS: CPT | Performed by: INTERNAL MEDICINE

## 2020-05-18 PROCEDURE — C1894 INTRO/SHEATH, NON-LASER: HCPCS | Performed by: INTERNAL MEDICINE

## 2020-05-18 PROCEDURE — C1769 GUIDE WIRE: HCPCS | Performed by: INTERNAL MEDICINE

## 2020-05-18 RX ORDER — DIPHENHYDRAMINE HCL 25 MG
25 TABLET ORAL EVERY 6 HOURS PRN
Status: DISCONTINUED | OUTPATIENT
Start: 2020-05-18 | End: 2020-05-18 | Stop reason: HOSPADM

## 2020-05-18 RX ORDER — FENTANYL CITRATE 50 UG/ML
INJECTION, SOLUTION INTRAMUSCULAR; INTRAVENOUS AS NEEDED
Status: DISCONTINUED | OUTPATIENT
Start: 2020-05-18 | End: 2020-05-18 | Stop reason: HOSPADM

## 2020-05-18 RX ORDER — CLOPIDOGREL BISULFATE 75 MG/1
75 TABLET ORAL DAILY
Status: DISCONTINUED | OUTPATIENT
Start: 2020-05-18 | End: 2020-05-18 | Stop reason: HOSPADM

## 2020-05-18 RX ORDER — NITROGLYCERIN 5 MG/ML
INJECTION, SOLUTION INTRAVENOUS AS NEEDED
Status: DISCONTINUED | OUTPATIENT
Start: 2020-05-18 | End: 2020-05-18 | Stop reason: HOSPADM

## 2020-05-18 RX ORDER — ONDANSETRON 4 MG/1
4 TABLET, FILM COATED ORAL EVERY 6 HOURS PRN
Status: DISCONTINUED | OUTPATIENT
Start: 2020-05-18 | End: 2020-05-18 | Stop reason: HOSPADM

## 2020-05-18 RX ORDER — SODIUM CHLORIDE 9 MG/ML
250 INJECTION, SOLUTION INTRAVENOUS ONCE AS NEEDED
Status: DISCONTINUED | OUTPATIENT
Start: 2020-05-18 | End: 2020-05-18 | Stop reason: HOSPADM

## 2020-05-18 RX ORDER — ACETAMINOPHEN 325 MG/1
650 TABLET ORAL EVERY 4 HOURS PRN
Status: DISCONTINUED | OUTPATIENT
Start: 2020-05-18 | End: 2020-05-18 | Stop reason: HOSPADM

## 2020-05-18 RX ORDER — ATROPINE SULFATE 1 MG/ML
.5-1 INJECTION, SOLUTION INTRAMUSCULAR; INTRAVENOUS; SUBCUTANEOUS
Status: DISCONTINUED | OUTPATIENT
Start: 2020-05-18 | End: 2020-05-18 | Stop reason: HOSPADM

## 2020-05-18 RX ORDER — MIDAZOLAM HYDROCHLORIDE 1 MG/ML
INJECTION INTRAMUSCULAR; INTRAVENOUS AS NEEDED
Status: DISCONTINUED | OUTPATIENT
Start: 2020-05-18 | End: 2020-05-18 | Stop reason: HOSPADM

## 2020-05-18 RX ORDER — ONDANSETRON 2 MG/ML
4 INJECTION INTRAMUSCULAR; INTRAVENOUS EVERY 6 HOURS PRN
Status: DISCONTINUED | OUTPATIENT
Start: 2020-05-18 | End: 2020-05-18 | Stop reason: HOSPADM

## 2020-05-18 RX ORDER — SODIUM CHLORIDE 9 MG/ML
30 INJECTION, SOLUTION INTRAVENOUS CONTINUOUS
Status: DISCONTINUED | OUTPATIENT
Start: 2020-05-18 | End: 2020-05-18 | Stop reason: HOSPADM

## 2020-05-18 RX ORDER — LIDOCAINE HYDROCHLORIDE 20 MG/ML
INJECTION, SOLUTION INFILTRATION; PERINEURAL AS NEEDED
Status: DISCONTINUED | OUTPATIENT
Start: 2020-05-18 | End: 2020-05-18 | Stop reason: HOSPADM

## 2020-05-18 RX ORDER — ISOSORBIDE MONONITRATE 60 MG/1
60 TABLET, EXTENDED RELEASE ORAL
Status: DISCONTINUED | OUTPATIENT
Start: 2020-05-18 | End: 2020-05-18 | Stop reason: HOSPADM

## 2020-05-18 RX ADMIN — SODIUM CHLORIDE 30 ML/HR: 900 INJECTION, SOLUTION INTRAVENOUS at 06:47

## 2020-05-18 NOTE — DISCHARGE INSTRUCTIONS
Post Cath Instructions      Call Dr. Arauz’s office to schedule a follow up appointment in 2 weeks at [phone].  Specific Physician Instructions:  1) Drink plenty of fluids for the next 24 hours.  This helps to eliminate the dye used in your procedure through urination.  You may resume a normal diet; however, try to avoid foods that would cause gas or constipation.    2) Sedative medication given to you during your catheterization may decrease your judgement and reaction time for up to 24-48 hours.  Therefore:  a. DO NOT drive or operate hazardous machinery (48 hours)  b. DO NOT consume alcoholic beverages  c. DO NOT make any important/legal decisions  d. Have someone stay with you for at least 24 hours    3) To allow proper healing and prevent bleeding, the following activities are to be strictly avoided for the next 24-48 hours:  a. Excessive bending at wound site  b. Straining (anything that would tense up muscles around the affected puncture site)  c. Lifting objects greater than 5 pounds, pushing, or pulling for 5 days    i. For Groin Cases:  1. Refrain from sexual activity  2. Refrain from running or vigorous walking  3. No prolonged sitting or standing  4. Limit stair climbing as much as possible    4) Keep the puncture site clean and dry.  You may remove the dressing tomorrow and replace it with a band-aid for at least one additional day.  Gently clean the site with mild soap and water.  No scrubbing/rubbing and lightly pat the area dry.  Showers are acceptable; however, avoid submerging in water (tub baths, hot tubs, swimming pools, dishwater, etc…) for at least one week.  The site should be completely healed before resuming these activities to reduce the risk of infection.  Check the site often.  Watch for signs and symptoms of infection and notify your physician if any of the following occur:  a. Bleeding or an increase in swelling at the puncture site  b. Fever  c. Increased soreness around puncture  site  d. Foul odor or significant drainage from the puncture site  e. Swelling, redness, or warmth at the puncture site    **A bruise or small “pea sized” lump under the skin at the puncture site is not unusual.  This should disappear within 3-4 weeks.**  5) CONTACT YOUR PHYSICIAN OR CALL 911 IF YOU EXPERIENCE ANY OF THE FOLLOWING:  a. Increased angina (chest pain) or frequent sensations of pressure, burning, pain, or other discomfort in the chest, arm, jaws, or stomach  b. Lightheadedness, dizziness, faint feeling, sweating, or difficulty breathing  c. Odd sensation changes like numbness, tingling, coldness, or pain in the arm or leg in which the catheter was inserted  d. Limb in which the catheter was inserted becomes pale/bluish in color    IMPORTANT:  Although this occurs very rarely, if you should develop bright red or excessive bleeding, feel a “pop” inside at the insertion site, or notice a sudden increase in swelling larger than a walnut, you should call 911.  Hold continuous firm pressure to the access site until emergency personnel arrive.  It is best if someone else can do this for you.

## 2020-06-04 ENCOUNTER — OFFICE VISIT (OUTPATIENT)
Dept: CARDIOLOGY | Facility: CLINIC | Age: 70
End: 2020-06-04

## 2020-06-04 VITALS
BODY MASS INDEX: 28.85 KG/M2 | SYSTOLIC BLOOD PRESSURE: 126 MMHG | HEIGHT: 72 IN | OXYGEN SATURATION: 96 % | WEIGHT: 213 LBS | DIASTOLIC BLOOD PRESSURE: 88 MMHG | HEART RATE: 56 BPM

## 2020-06-04 DIAGNOSIS — R94.39 ABNORMAL NUCLEAR STRESS TEST: ICD-10-CM

## 2020-06-04 DIAGNOSIS — I20.9 ANGINA, CLASS II (HCC): Primary | ICD-10-CM

## 2020-06-04 DIAGNOSIS — Z95.820 STATUS POST ANGIOPLASTY WITH STENT: ICD-10-CM

## 2020-06-04 DIAGNOSIS — Z95.5 HX OF HEART ARTERY STENT: ICD-10-CM

## 2020-06-04 PROCEDURE — 99214 OFFICE O/P EST MOD 30 MIN: CPT | Performed by: INTERNAL MEDICINE

## 2020-06-04 RX ORDER — ISOSORBIDE MONONITRATE 60 MG/1
60 TABLET, EXTENDED RELEASE ORAL EVERY MORNING
COMMUNITY
Start: 2020-05-18

## 2020-06-04 RX ORDER — CLOPIDOGREL BISULFATE 75 MG/1
75 TABLET ORAL EVERY MORNING
COMMUNITY
Start: 2020-05-18

## 2020-06-04 NOTE — PROGRESS NOTES
Encounter Date:06/04/2020  Recent cardiac catheterization follow-up      Patient ID: Edi Hager is a 69 y.o. male.    Chief Complaint:  History of  Chest discomfort  Status post myocardial infarction  Status post stent placement  Recent cardiac catheterization follow-up        History of Present Illness  Patient recently had cardiac catheterization and patient was discharged home on medical therapy.    Since I have last seen, the patient has been without any chest discomfort ,shortness of breath, palpitations, dizziness or syncope.  Denies having any headache ,abdominal pain ,nausea, vomiting , diarrhea constipation, loss of weight or loss of appetite.  Denies having any excessive bruising ,hematuria or blood in the stool.    Review of all systems negative except as indicated  Assessment and Plan         ////////////////////////  Impression  ===========  -Chest discomfort suggestive of angina pectoris.  Stress Cardiolite test 5/14/2020 revealed significant inferior apical and posterior lateral ischemia with reproducible chest discomfort with exercise     -status post myocardial infarction  1999 and stent placement to circumflex coronary artery in 1999 (in [place]) .   last cardiac catheterization 2011 revealed patent circumflex stent.  Totally occluded mid LAD with collateral filling of the distal LAD    Cardiac catheterization 5/18/2020  Left ventricle size and contractility normal with ejection fraction of 60%.  Left main coronary artery normal.  Left anterior descending artery has diffuse calcification..  Mid to distal segment of the left into descending artery has diffuse 99% disease.  Please note LAD was totally occluded with collateral filling in 2011.  Distal LAD near the apex is totally occluded  Circumflex coronary artery showed luminal irregularities.  Right coronary artery has diffuse calcification.  Left ventricle branch and PDA has 50 to 60% proximal disease.     Patient had totally occluded  left anterior descending artery in 2011.  Patient now has diffuse disease in the LAD in the midsegment.  Consideration was given for possible intervention however the benefit of intervention was thought to be less and also in-stent restenosis and occlusion rate probably is higher.  For this reason medical therapy is being considered.    Stress Cardiolite test showed predictable  apical and distal inferior ischemic changes with excellent exercise tolerance.  May 7, 2019      -dyslipidemia      -history of the elevated blood sugars with normal hemoglobin A1c      -status post rotator cuff surgery and double hernia repair      -former smoker  ===========   Plan  =============  Recent cardiac catheterization 5/18/2020 revealed  Left ventricle size and contractility normal with ejection fraction of 60%.  Left main coronary artery normal.  Left anterior descending artery has diffuse calcification..  Mid to distal segment of the left into descending artery has diffuse 99% disease.  Please note LAD was totally occluded with collateral filling in 2011.  Distal LAD near the apex is totally occluded  Circumflex coronary artery showed luminal irregularities.  Right coronary artery has diffuse calcification.  Left ventricle branch and PDA has 50 to 60% proximal disease.     Patient had totally occluded left anterior descending artery in 2011.  Patient now has diffuse disease in the LAD in the midsegment.  Consideration was given for possible intervention however the benefit of intervention was thought to be less and also in-stent restenosis and occlusion rate probably is higher.  For this reason medical therapy is being considered.    Patient was educated regarding the cardiac catheterization results and rationale behind medical therapy.    Patient is not having any angina pectoris or congestive heart failure.    Patient is able to tolerate recently started Imdur 60 mg and Plavix 75 mg a day and continue  beta-blocker.    Medications were reviewed and updated.    Follow-up in the office in 3 months.    Further plan will depend on patient's progress.  ////////////////////////                Diagnosis Plan   1. Angina, class II (CMS/HCC)     2. Abnormal nuclear stress test     3. Hx of heart artery stent     4. Status post angioplasty with stent     LAB RESULTS (LAST 7 DAYS)    CBC        BMP        CMP         BNP        TROPONIN        CoAg        Creatinine Clearance  Estimated Creatinine Clearance: 97.9 mL/min (by C-G formula based on SCr of 0.87 mg/dL).    ABG        Radiology  No radiology results for the last day                The following portions of the patient's history were reviewed and updated as appropriate: allergies, current medications, past family history, past medical history, past social history, past surgical history and problem list.    Review of Systems   Constitution: Negative for fever and malaise/fatigue.   HENT: Negative for congestion and hearing loss.    Eyes: Negative for double vision and visual disturbance.   Cardiovascular: Negative for chest pain, claudication, dyspnea on exertion, leg swelling and syncope.   Respiratory: Negative for cough and shortness of breath.    Endocrine: Negative for cold intolerance.   Skin: Negative for color change and rash.   Musculoskeletal: Negative for arthritis and joint pain.   Gastrointestinal: Negative for abdominal pain and heartburn.   Genitourinary: Negative for hematuria.   Neurological: Negative for excessive daytime sleepiness and dizziness.   Psychiatric/Behavioral: Negative for depression. The patient is not nervous/anxious.    All other systems reviewed and are negative.        Current Outpatient Medications:   •  aspirin (ASPIR) 81 MG EC tablet, ASPIRIN EC LO-DOSE 81 MG ORAL TABLET DELAYED RELEASE, Disp: , Rfl:   •  atenolol (TENORMIN) 25 MG tablet, Take 1 tablet by mouth Daily., Disp: 90 tablet, Rfl: 3  •  cetirizine (ZYRTEC ALLERGY) 10  MG tablet, Daily., Disp: , Rfl:   •  clopidogrel (PLAVIX) 75 MG tablet, Take 75 mg by mouth Every Morning., Disp: , Rfl:   •  isosorbide mononitrate (IMDUR) 60 MG 24 hr tablet, Take 60 mg by mouth Every Morning., Disp: , Rfl:   •  simvastatin (ZOCOR) 40 MG tablet, Take 1 tablet by mouth Every Night., Disp: 90 tablet, Rfl: 3  •  vitamin C (ASCORBIC ACID) 500 MG tablet, Take 500 mg by mouth Daily., Disp: , Rfl:     No Known Allergies    Family History   Problem Relation Age of Onset   • Hypertension Mother    • Diabetes Mother        Past Surgical History:   Procedure Laterality Date   • CARDIAC CATHETERIZATION     • CARDIAC CATHETERIZATION N/A 2020    Procedure: Left Heart Cath with Coronary Angiography;  Surgeon: Virginie Arauz MD;  Location: [facility] CATH INVASIVE LOCATION;  Service: Cardiovascular;  Laterality: N/A;   • CARDIAC CATHETERIZATION N/A 2020    Procedure: Left ventriculography;  Surgeon: Virginie Arauz MD;  Location: [facility] CATH INVASIVE LOCATION;  Service: Cardiovascular;  Laterality: N/A;   • CORONARY ANGIOPLASTY WITH STENT PLACEMENT     • HERNIA REPAIR      Hernia surgery (Two surgery)   • ROTATOR CUFF REPAIR Right        Past Medical History:   Diagnosis Date   • Coronary artery disease    • Gout    • Hx of colonoscopy 2012    NML   • Hyperglycemia    • Hyperlipidemia    • MI (myocardial infarction) (CMS/HCC)     15 years ago        Family History   Problem Relation Age of Onset   • Hypertension Mother    • Diabetes Mother        Social History     Socioeconomic History   • Marital status:      Spouse name: Not on file   • Number of children: Not on file   • Years of education: Not on file   • Highest education level: Not on file   Tobacco Use   • Smoking status: Former Smoker     Last attempt to quit: 1980     Years since quittin.4   • Smokeless tobacco: Never Used   Substance and Sexual Activity   • Alcohol use: Yes     Comment: 2oz   • Drug use: No   • Sexual  "activity: Defer         Procedures      Objective:       Physical Exam    /88   Pulse 56   Ht 182.9 cm (72\")   Wt 96.6 kg (213 lb)   SpO2 96%   BMI 28.89 kg/m²   The patient is alert, oriented and in no distress.    Vital signs as noted above.    Head and neck revealed no carotid bruits or jugular venous distension.  No thyromegaly or lymphadenopathy is present.    Lungs clear.  No wheezing.  Breath sounds are normal bilaterally.    Heart normal first and second heart sounds.  No murmur..  No pericardial rub is present.  No gallop is present.    Abdomen soft and nontender.  No organomegaly is present.    Extremities revealed good peripheral pulses without any pedal edema.    Skin warm and dry.    Musculoskeletal system is grossly normal.    CNS grossly normal.        "

## 2020-07-18 ENCOUNTER — APPOINTMENT (OUTPATIENT)
Dept: CT IMAGING | Facility: HOSPITAL | Age: 70
End: 2020-07-18

## 2020-07-18 ENCOUNTER — APPOINTMENT (OUTPATIENT)
Dept: GENERAL RADIOLOGY | Facility: HOSPITAL | Age: 70
End: 2020-07-18

## 2020-07-18 ENCOUNTER — HOSPITAL ENCOUNTER (OUTPATIENT)
Facility: HOSPITAL | Age: 70
Setting detail: OBSERVATION
Discharge: HOME | End: 2020-07-19
Attending: INTERNAL MEDICINE | Admitting: INTERNAL MEDICINE

## 2020-07-18 DIAGNOSIS — R07.9 CHEST PAIN, UNSPECIFIED TYPE: Primary | ICD-10-CM

## 2020-07-18 LAB
ALBUMIN SERPL-MCNC: 4.6 G/DL (ref 3.5–5.2)
ALBUMIN/GLOB SERPL: 1.4 G/DL
ALP SERPL-CCNC: 66 U/L (ref 39–117)
ALT SERPL W P-5'-P-CCNC: 15 U/L (ref 1–41)
ANION GAP SERPL CALCULATED.3IONS-SCNC: 13 MMOL/L (ref 5–15)
AST SERPL-CCNC: 19 U/L (ref 1–40)
BASOPHILS # BLD AUTO: 0.1 10*3/MM3 (ref 0–0.2)
BASOPHILS NFR BLD AUTO: 0.8 % (ref 0–1.5)
BILIRUB SERPL-MCNC: 0.5 MG/DL (ref 0–1.2)
BUN SERPL-MCNC: 15 MG/DL (ref 8–23)
BUN SERPL-MCNC: ABNORMAL MG/DL
BUN/CREAT SERPL: ABNORMAL
CALCIUM SPEC-SCNC: 9.3 MG/DL (ref 8.6–10.5)
CHLORIDE SERPL-SCNC: 100 MMOL/L (ref 98–107)
CO2 SERPL-SCNC: 28 MMOL/L (ref 22–29)
CREAT SERPL-MCNC: 1.01 MG/DL (ref 0.76–1.27)
DEPRECATED RDW RBC AUTO: 44.2 FL (ref 37–54)
EOSINOPHIL # BLD AUTO: 0.3 10*3/MM3 (ref 0–0.4)
EOSINOPHIL NFR BLD AUTO: 3.2 % (ref 0.3–6.2)
ERYTHROCYTE [DISTWIDTH] IN BLOOD BY AUTOMATED COUNT: 13.4 % (ref 12.3–15.4)
GFR SERPL CREATININE-BSD FRML MDRD: 73 ML/MIN/1.73
GLOBULIN UR ELPH-MCNC: 3.3 GM/DL
GLUCOSE SERPL-MCNC: 162 MG/DL (ref 65–99)
HCT VFR BLD AUTO: 43.8 % (ref 37.5–51)
HGB BLD-MCNC: 15.3 G/DL (ref 13–17.7)
INR PPP: 1 (ref 0.9–1.1)
LYMPHOCYTES # BLD AUTO: 2.2 10*3/MM3 (ref 0.7–3.1)
LYMPHOCYTES NFR BLD AUTO: 24.4 % (ref 19.6–45.3)
MCH RBC QN AUTO: 32.6 PG (ref 26.6–33)
MCHC RBC AUTO-ENTMCNC: 34.9 G/DL (ref 31.5–35.7)
MCV RBC AUTO: 93.4 FL (ref 79–97)
MONOCYTES # BLD AUTO: 0.8 10*3/MM3 (ref 0.1–0.9)
MONOCYTES NFR BLD AUTO: 8.9 % (ref 5–12)
NEUTROPHILS NFR BLD AUTO: 5.7 10*3/MM3 (ref 1.7–7)
NEUTROPHILS NFR BLD AUTO: 62.7 % (ref 42.7–76)
NRBC BLD AUTO-RTO: 0 /100 WBC (ref 0–0.2)
NT-PROBNP SERPL-MCNC: 89.8 PG/ML (ref 0–900)
PLATELET # BLD AUTO: 218 10*3/MM3 (ref 140–450)
PMV BLD AUTO: 8.3 FL (ref 6–12)
POTASSIUM SERPL-SCNC: 4.1 MMOL/L (ref 3.5–5.2)
PROT SERPL-MCNC: 7.9 G/DL (ref 6–8.5)
PROTHROMBIN TIME: 10.5 SECONDS (ref 9.6–11.7)
RBC # BLD AUTO: 4.69 10*6/MM3 (ref 4.14–5.8)
SODIUM SERPL-SCNC: 141 MMOL/L (ref 136–145)
TROPONIN T SERPL-MCNC: <0.01 NG/ML (ref 0–0.03)
WBC # BLD AUTO: 9.2 10*3/MM3 (ref 3.4–10.8)

## 2020-07-18 PROCEDURE — 80053 COMPREHEN METABOLIC PANEL: CPT | Performed by: NURSE PRACTITIONER

## 2020-07-18 PROCEDURE — 85025 COMPLETE CBC W/AUTO DIFF WBC: CPT | Performed by: NURSE PRACTITIONER

## 2020-07-18 PROCEDURE — 71045 X-RAY EXAM CHEST 1 VIEW: CPT

## 2020-07-18 PROCEDURE — 96374 THER/PROPH/DIAG INJ IV PUSH: CPT

## 2020-07-18 PROCEDURE — 0 IOPAMIDOL PER 1 ML: Performed by: NURSE PRACTITIONER

## 2020-07-18 PROCEDURE — 99285 EMERGENCY DEPT VISIT HI MDM: CPT

## 2020-07-18 PROCEDURE — 93005 ELECTROCARDIOGRAM TRACING: CPT

## 2020-07-18 PROCEDURE — 71275 CT ANGIOGRAPHY CHEST: CPT

## 2020-07-18 PROCEDURE — 85610 PROTHROMBIN TIME: CPT | Performed by: NURSE PRACTITIONER

## 2020-07-18 PROCEDURE — 84484 ASSAY OF TROPONIN QUANT: CPT | Performed by: NURSE PRACTITIONER

## 2020-07-18 PROCEDURE — 25010000002 MORPHINE PER 10 MG: Performed by: NURSE PRACTITIONER

## 2020-07-18 PROCEDURE — 83880 ASSAY OF NATRIURETIC PEPTIDE: CPT | Performed by: NURSE PRACTITIONER

## 2020-07-18 RX ORDER — SODIUM CHLORIDE 0.9 % (FLUSH) 0.9 %
10 SYRINGE (ML) INJECTION AS NEEDED
Status: DISCONTINUED | OUTPATIENT
Start: 2020-07-18 | End: 2020-07-19 | Stop reason: HOSPADM

## 2020-07-18 RX ORDER — ASPIRIN 81 MG/1
162 TABLET, CHEWABLE ORAL ONCE
Status: COMPLETED | OUTPATIENT
Start: 2020-07-18 | End: 2020-07-18

## 2020-07-18 RX ORDER — MORPHINE SULFATE 4 MG/ML
4 INJECTION, SOLUTION INTRAMUSCULAR; INTRAVENOUS ONCE
Status: COMPLETED | OUTPATIENT
Start: 2020-07-18 | End: 2020-07-18

## 2020-07-18 RX ORDER — NITROGLYCERIN 0.4 MG/1
0.4 TABLET SUBLINGUAL
Status: COMPLETED | OUTPATIENT
Start: 2020-07-18 | End: 2020-07-18

## 2020-07-18 RX ADMIN — IOPAMIDOL 100 ML: 755 INJECTION, SOLUTION INTRAVENOUS at 23:47

## 2020-07-18 RX ADMIN — NITROGLYCERIN 0.4 MG: 0.4 TABLET SUBLINGUAL at 21:37

## 2020-07-18 RX ADMIN — MORPHINE SULFATE 4 MG: 4 INJECTION INTRAVENOUS at 23:03

## 2020-07-18 RX ADMIN — NITROGLYCERIN 0.4 MG: 0.4 TABLET SUBLINGUAL at 22:27

## 2020-07-18 RX ADMIN — ASPIRIN 81 MG 162 MG: 81 TABLET ORAL at 21:37

## 2020-07-18 RX ADMIN — NITROGLYCERIN 0.4 MG: 0.4 TABLET SUBLINGUAL at 22:09

## 2020-07-19 ENCOUNTER — APPOINTMENT (OUTPATIENT)
Dept: CARDIOLOGY | Facility: HOSPITAL | Age: 70
End: 2020-07-19

## 2020-07-19 ENCOUNTER — READMISSION MANAGEMENT (OUTPATIENT)
Dept: CALL CENTER | Facility: HOSPITAL | Age: 70
End: 2020-07-19

## 2020-07-19 VITALS
TEMPERATURE: 98.2 F | BODY MASS INDEX: 28.99 KG/M2 | RESPIRATION RATE: 18 BRPM | OXYGEN SATURATION: 96 % | DIASTOLIC BLOOD PRESSURE: 69 MMHG | HEART RATE: 61 BPM | SYSTOLIC BLOOD PRESSURE: 133 MMHG | WEIGHT: 214 LBS | HEIGHT: 72 IN

## 2020-07-19 PROBLEM — R07.9 CHEST PAIN: Status: ACTIVE | Noted: 2020-07-19

## 2020-07-19 LAB
B PARAPERT DNA SPEC QL NAA+PROBE: NOT DETECTED
B PERT DNA SPEC QL NAA+PROBE: NOT DETECTED
BH CV ECHO MEAS - ACS: 1.6 CM
BH CV ECHO MEAS - AI DEC SLOPE: 99.9 CM/SEC^2
BH CV ECHO MEAS - AI DEC TIME: 3.4 SEC
BH CV ECHO MEAS - AI MAX PG: 45.7 MMHG
BH CV ECHO MEAS - AI MAX VEL: 338 CM/SEC
BH CV ECHO MEAS - AI P1/2T: 991.3 MSEC
BH CV ECHO MEAS - AO MAX PG (FULL): 14.5 MMHG
BH CV ECHO MEAS - AO MAX PG: 17 MMHG
BH CV ECHO MEAS - AO MEAN PG (FULL): 8 MMHG
BH CV ECHO MEAS - AO MEAN PG: 9.8 MMHG
BH CV ECHO MEAS - AO ROOT AREA (BSA CORRECTED): 1.3
BH CV ECHO MEAS - AO ROOT AREA: 6.2 CM^2
BH CV ECHO MEAS - AO ROOT DIAM: 2.8 CM
BH CV ECHO MEAS - AO V2 MAX: 206.3 CM/SEC
BH CV ECHO MEAS - AO V2 MEAN: 147.2 CM/SEC
BH CV ECHO MEAS - AO V2 VTI: 46.5 CM
BH CV ECHO MEAS - AORTIC HR: 54.9 BPM
BH CV ECHO MEAS - AORTIC R-R: 1.1 SEC
BH CV ECHO MEAS - ASC AORTA: 3 CM
BH CV ECHO MEAS - AVA(I,A): 1.6 CM^2
BH CV ECHO MEAS - AVA(I,D): 1.6 CM^2
BH CV ECHO MEAS - AVA(V,A): 1.5 CM^2
BH CV ECHO MEAS - AVA(V,D): 1.5 CM^2
BH CV ECHO MEAS - BSA(HAYCOCK): 2.2 M^2
BH CV ECHO MEAS - BSA: 2.2 M^2
BH CV ECHO MEAS - BZI_BMI: 29 KILOGRAMS/M^2
BH CV ECHO MEAS - BZI_METRIC_HEIGHT: 182.9 CM
BH CV ECHO MEAS - BZI_METRIC_WEIGHT: 97.1 KG
BH CV ECHO MEAS - CI(AO): 7.2 L/MIN/M^2
BH CV ECHO MEAS - CI(LVOT): 1.9 L/MIN/M^2
BH CV ECHO MEAS - CO(AO): 15.7 L/MIN
BH CV ECHO MEAS - CO(LVOT): 4.2 L/MIN
BH CV ECHO MEAS - EDV(CUBED): 54.3 ML
BH CV ECHO MEAS - EDV(MOD-SP2): 48.5 ML
BH CV ECHO MEAS - EDV(MOD-SP4): 80.2 ML
BH CV ECHO MEAS - EDV(TEICH): 61.4 ML
BH CV ECHO MEAS - EF(CUBED): 55.7 %
BH CV ECHO MEAS - EF(MOD-BP): 59 %
BH CV ECHO MEAS - EF(MOD-SP2): 45.5 %
BH CV ECHO MEAS - EF(MOD-SP4): 66.8 %
BH CV ECHO MEAS - EF(TEICH): 48.2 %
BH CV ECHO MEAS - ESV(CUBED): 24 ML
BH CV ECHO MEAS - ESV(MOD-SP2): 26.4 ML
BH CV ECHO MEAS - ESV(MOD-SP4): 26.6 ML
BH CV ECHO MEAS - ESV(TEICH): 31.8 ML
BH CV ECHO MEAS - FS: 23.8 %
BH CV ECHO MEAS - IVS/LVPW: 1.1
BH CV ECHO MEAS - IVSD: 1.6 CM
BH CV ECHO MEAS - LA DIMENSION(2D): 2.6 CM
BH CV ECHO MEAS - LV DIASTOLIC VOL/BSA (35-75): 36.6 ML/M^2
BH CV ECHO MEAS - LV MASS(C)D: 221 GRAMS
BH CV ECHO MEAS - LV MASS(C)DI: 100.8 GRAMS/M^2
BH CV ECHO MEAS - LV MAX PG: 2.5 MMHG
BH CV ECHO MEAS - LV MEAN PG: 1.7 MMHG
BH CV ECHO MEAS - LV SYSTOLIC VOL/BSA (12-30): 12.1 ML/M^2
BH CV ECHO MEAS - LV V1 MAX: 79 CM/SEC
BH CV ECHO MEAS - LV V1 MEAN: 64.4 CM/SEC
BH CV ECHO MEAS - LV V1 VTI: 20.1 CM
BH CV ECHO MEAS - LVIDD: 3.8 CM
BH CV ECHO MEAS - LVIDS: 2.9 CM
BH CV ECHO MEAS - LVOT AREA: 3.8 CM^2
BH CV ECHO MEAS - LVOT DIAM: 2.2 CM
BH CV ECHO MEAS - LVPWD: 1.4 CM
BH CV ECHO MEAS - MV A MAX VEL: 94 CM/SEC
BH CV ECHO MEAS - MV DEC SLOPE: 195.2 CM/SEC^2
BH CV ECHO MEAS - MV DEC TIME: 0.3 SEC
BH CV ECHO MEAS - MV E MAX VEL: 59.2 CM/SEC
BH CV ECHO MEAS - MV E/A: 0.63
BH CV ECHO MEAS - MV MAX PG: 3.6 MMHG
BH CV ECHO MEAS - MV MEAN PG: 1.4 MMHG
BH CV ECHO MEAS - MV V2 MAX: 95.1 CM/SEC
BH CV ECHO MEAS - MV V2 MEAN: 52.1 CM/SEC
BH CV ECHO MEAS - MV V2 VTI: 31.3 CM
BH CV ECHO MEAS - MVA(VTI): 2.4 CM^2
BH CV ECHO MEAS - PA ACC TIME: 0.17 SEC
BH CV ECHO MEAS - PA MAX PG (FULL): 3.9 MMHG
BH CV ECHO MEAS - PA MAX PG: 5 MMHG
BH CV ECHO MEAS - PA MEAN PG (FULL): 1.3 MMHG
BH CV ECHO MEAS - PA MEAN PG: 2 MMHG
BH CV ECHO MEAS - PA PR(ACCEL): 3.6 MMHG
BH CV ECHO MEAS - PA V2 MAX: 111.9 CM/SEC
BH CV ECHO MEAS - PA V2 MEAN: 65.7 CM/SEC
BH CV ECHO MEAS - PA V2 VTI: 20.3 CM
BH CV ECHO MEAS - PULM DIAS VEL: 83.3 CM/SEC
BH CV ECHO MEAS - PULM S/D: 1.1
BH CV ECHO MEAS - PULM SYS VEL: 93.2 CM/SEC
BH CV ECHO MEAS - PVA(I,A): 2.6 CM^2
BH CV ECHO MEAS - PVA(I,D): 2.6 CM^2
BH CV ECHO MEAS - PVA(V,A): 2 CM^2
BH CV ECHO MEAS - PVA(V,D): 2 CM^2
BH CV ECHO MEAS - QP/QS: 0.7
BH CV ECHO MEAS - RAP SYSTOLE: 3 MMHG
BH CV ECHO MEAS - RV MAX PG: 1.1 MMHG
BH CV ECHO MEAS - RV MEAN PG: 0.68 MMHG
BH CV ECHO MEAS - RV V1 MAX: 52.9 CM/SEC
BH CV ECHO MEAS - RV V1 MEAN: 39.9 CM/SEC
BH CV ECHO MEAS - RV V1 VTI: 12.9 CM
BH CV ECHO MEAS - RVDD: 2.6 CM
BH CV ECHO MEAS - RVOT AREA: 4.1 CM^2
BH CV ECHO MEAS - RVOT DIAM: 2.3 CM
BH CV ECHO MEAS - RVSP: 13.8 MMHG
BH CV ECHO MEAS - SI(AO): 130.7 ML/M^2
BH CV ECHO MEAS - SI(CUBED): 13.8 ML/M^2
BH CV ECHO MEAS - SI(LVOT): 34.9 ML/M^2
BH CV ECHO MEAS - SI(MOD-SP2): 10.1 ML/M^2
BH CV ECHO MEAS - SI(MOD-SP4): 24.4 ML/M^2
BH CV ECHO MEAS - SI(TEICH): 13.5 ML/M^2
BH CV ECHO MEAS - SV(AO): 286.6 ML
BH CV ECHO MEAS - SV(CUBED): 30.2 ML
BH CV ECHO MEAS - SV(LVOT): 76.5 ML
BH CV ECHO MEAS - SV(MOD-SP2): 22.1 ML
BH CV ECHO MEAS - SV(MOD-SP4): 53.6 ML
BH CV ECHO MEAS - SV(RVOT): 53.3 ML
BH CV ECHO MEAS - SV(TEICH): 29.6 ML
BH CV ECHO MEAS - TR MAX VEL: 164.2 CM/SEC
C PNEUM DNA NPH QL NAA+NON-PROBE: NOT DETECTED
FLUAV H1 2009 PAND RNA NPH QL NAA+PROBE: NOT DETECTED
FLUAV H1 HA GENE NPH QL NAA+PROBE: NOT DETECTED
FLUAV H3 RNA NPH QL NAA+PROBE: NOT DETECTED
FLUAV SUBTYP SPEC NAA+PROBE: NOT DETECTED
FLUBV RNA ISLT QL NAA+PROBE: NOT DETECTED
HADV DNA SPEC NAA+PROBE: NOT DETECTED
HCOV 229E RNA SPEC QL NAA+PROBE: NOT DETECTED
HCOV HKU1 RNA SPEC QL NAA+PROBE: NOT DETECTED
HCOV NL63 RNA SPEC QL NAA+PROBE: NOT DETECTED
HCOV OC43 RNA SPEC QL NAA+PROBE: NOT DETECTED
HMPV RNA NPH QL NAA+NON-PROBE: NOT DETECTED
HPIV1 RNA SPEC QL NAA+PROBE: NOT DETECTED
HPIV2 RNA SPEC QL NAA+PROBE: NOT DETECTED
HPIV3 RNA NPH QL NAA+PROBE: NOT DETECTED
HPIV4 P GENE NPH QL NAA+PROBE: NOT DETECTED
LV EF 2D ECHO EST: 60 %
M PNEUMO IGG SER IA-ACNC: NOT DETECTED
RHINOVIRUS RNA SPEC NAA+PROBE: NOT DETECTED
RSV RNA NPH QL NAA+NON-PROBE: NOT DETECTED
SARS-COV-2 RNA NPH QL NAA+NON-PROBE: NOT DETECTED
TROPONIN T SERPL-MCNC: <0.01 NG/ML (ref 0–0.03)

## 2020-07-19 PROCEDURE — 83036 HEMOGLOBIN GLYCOSYLATED A1C: CPT | Performed by: NURSE PRACTITIONER

## 2020-07-19 PROCEDURE — 25010000002 ENOXAPARIN PER 10 MG: Performed by: NURSE PRACTITIONER

## 2020-07-19 PROCEDURE — 93306 TTE W/DOPPLER COMPLETE: CPT

## 2020-07-19 PROCEDURE — 93306 TTE W/DOPPLER COMPLETE: CPT | Performed by: INTERNAL MEDICINE

## 2020-07-19 PROCEDURE — 96361 HYDRATE IV INFUSION ADD-ON: CPT

## 2020-07-19 PROCEDURE — 96376 TX/PRO/DX INJ SAME DRUG ADON: CPT

## 2020-07-19 PROCEDURE — 99214 OFFICE O/P EST MOD 30 MIN: CPT | Performed by: INTERNAL MEDICINE

## 2020-07-19 PROCEDURE — G0378 HOSPITAL OBSERVATION PER HR: HCPCS

## 2020-07-19 PROCEDURE — 99235 HOSP IP/OBS SAME DATE MOD 70: CPT | Performed by: INTERNAL MEDICINE

## 2020-07-19 PROCEDURE — 96372 THER/PROPH/DIAG INJ SC/IM: CPT

## 2020-07-19 PROCEDURE — 25010000002 MORPHINE PER 10 MG: Performed by: NURSE PRACTITIONER

## 2020-07-19 PROCEDURE — 0202U NFCT DS 22 TRGT SARS-COV-2: CPT | Performed by: INTERNAL MEDICINE

## 2020-07-19 PROCEDURE — 84484 ASSAY OF TROPONIN QUANT: CPT | Performed by: NURSE PRACTITIONER

## 2020-07-19 RX ORDER — ACETAMINOPHEN 325 MG/1
650 TABLET ORAL EVERY 4 HOURS PRN
Status: DISCONTINUED | OUTPATIENT
Start: 2020-07-19 | End: 2020-07-19 | Stop reason: HOSPADM

## 2020-07-19 RX ORDER — NITROGLYCERIN 0.4 MG/1
0.4 TABLET SUBLINGUAL
Status: DISCONTINUED | OUTPATIENT
Start: 2020-07-19 | End: 2020-07-19 | Stop reason: HOSPADM

## 2020-07-19 RX ORDER — ASPIRIN 81 MG/1
81 TABLET ORAL DAILY
Status: DISCONTINUED | OUTPATIENT
Start: 2020-07-19 | End: 2020-07-19 | Stop reason: HOSPADM

## 2020-07-19 RX ORDER — CLOPIDOGREL BISULFATE 75 MG/1
75 TABLET ORAL DAILY
Status: DISCONTINUED | OUTPATIENT
Start: 2020-07-19 | End: 2020-07-19 | Stop reason: HOSPADM

## 2020-07-19 RX ORDER — ATORVASTATIN CALCIUM 20 MG/1
20 TABLET, FILM COATED ORAL DAILY
Status: DISCONTINUED | OUTPATIENT
Start: 2020-07-19 | End: 2020-07-19 | Stop reason: HOSPADM

## 2020-07-19 RX ORDER — CETIRIZINE HYDROCHLORIDE 10 MG/1
10 TABLET ORAL DAILY
Status: DISCONTINUED | OUTPATIENT
Start: 2020-07-19 | End: 2020-07-19 | Stop reason: HOSPADM

## 2020-07-19 RX ORDER — ASCORBIC ACID 500 MG
500 TABLET ORAL DAILY
Status: DISCONTINUED | OUTPATIENT
Start: 2020-07-19 | End: 2020-07-19 | Stop reason: HOSPADM

## 2020-07-19 RX ORDER — SODIUM CHLORIDE 0.9 % (FLUSH) 0.9 %
10 SYRINGE (ML) INJECTION EVERY 12 HOURS SCHEDULED
Status: DISCONTINUED | OUTPATIENT
Start: 2020-07-19 | End: 2020-07-19 | Stop reason: HOSPADM

## 2020-07-19 RX ORDER — SODIUM CHLORIDE 9 MG/ML
75 INJECTION, SOLUTION INTRAVENOUS CONTINUOUS
Status: DISCONTINUED | OUTPATIENT
Start: 2020-07-19 | End: 2020-07-19 | Stop reason: HOSPADM

## 2020-07-19 RX ORDER — SODIUM CHLORIDE 0.9 % (FLUSH) 0.9 %
10 SYRINGE (ML) INJECTION AS NEEDED
Status: DISCONTINUED | OUTPATIENT
Start: 2020-07-19 | End: 2020-07-19 | Stop reason: HOSPADM

## 2020-07-19 RX ORDER — CHOLECALCIFEROL (VITAMIN D3) 125 MCG
5 CAPSULE ORAL NIGHTLY PRN
Status: DISCONTINUED | OUTPATIENT
Start: 2020-07-19 | End: 2020-07-19 | Stop reason: HOSPADM

## 2020-07-19 RX ORDER — ACETAMINOPHEN 160 MG/5ML
650 SOLUTION ORAL EVERY 4 HOURS PRN
Status: DISCONTINUED | OUTPATIENT
Start: 2020-07-19 | End: 2020-07-19 | Stop reason: HOSPADM

## 2020-07-19 RX ORDER — ONDANSETRON 4 MG/1
4 TABLET, FILM COATED ORAL EVERY 6 HOURS PRN
Status: DISCONTINUED | OUTPATIENT
Start: 2020-07-19 | End: 2020-07-19 | Stop reason: HOSPADM

## 2020-07-19 RX ORDER — ATENOLOL 25 MG/1
25 TABLET ORAL DAILY
Status: DISCONTINUED | OUTPATIENT
Start: 2020-07-19 | End: 2020-07-19 | Stop reason: HOSPADM

## 2020-07-19 RX ORDER — MORPHINE SULFATE 4 MG/ML
2 INJECTION, SOLUTION INTRAMUSCULAR; INTRAVENOUS EVERY 4 HOURS PRN
Status: DISCONTINUED | OUTPATIENT
Start: 2020-07-19 | End: 2020-07-19 | Stop reason: HOSPADM

## 2020-07-19 RX ORDER — ONDANSETRON 2 MG/ML
4 INJECTION INTRAMUSCULAR; INTRAVENOUS EVERY 6 HOURS PRN
Status: DISCONTINUED | OUTPATIENT
Start: 2020-07-19 | End: 2020-07-19 | Stop reason: HOSPADM

## 2020-07-19 RX ORDER — ACETAMINOPHEN 650 MG/1
650 SUPPOSITORY RECTAL EVERY 4 HOURS PRN
Status: DISCONTINUED | OUTPATIENT
Start: 2020-07-19 | End: 2020-07-19 | Stop reason: HOSPADM

## 2020-07-19 RX ORDER — HYDROCODONE BITARTRATE AND ACETAMINOPHEN 5; 325 MG/1; MG/1
1 TABLET ORAL EVERY 8 HOURS PRN
Qty: 14 TABLET | Refills: 0 | Status: SHIPPED | OUTPATIENT
Start: 2020-07-19 | End: 2020-07-24

## 2020-07-19 RX ORDER — ISOSORBIDE MONONITRATE 60 MG/1
60 TABLET, EXTENDED RELEASE ORAL DAILY
Status: DISCONTINUED | OUTPATIENT
Start: 2020-07-19 | End: 2020-07-19 | Stop reason: HOSPADM

## 2020-07-19 RX ORDER — NITROGLYCERIN 0.4 MG/1
0.4 TABLET SUBLINGUAL
Qty: 30 TABLET | Refills: 12 | Status: SHIPPED | OUTPATIENT
Start: 2020-07-19

## 2020-07-19 RX ADMIN — MORPHINE SULFATE 2 MG: 4 INJECTION INTRAVENOUS at 02:25

## 2020-07-19 RX ADMIN — ATENOLOL 25 MG: 25 TABLET ORAL at 08:36

## 2020-07-19 RX ADMIN — MORPHINE SULFATE 2 MG: 4 INJECTION INTRAVENOUS at 06:52

## 2020-07-19 RX ADMIN — CETIRIZINE HYDROCHLORIDE 10 MG: 10 TABLET, FILM COATED ORAL at 08:36

## 2020-07-19 RX ADMIN — ISOSORBIDE MONONITRATE 60 MG: 60 TABLET, EXTENDED RELEASE ORAL at 08:36

## 2020-07-19 RX ADMIN — SODIUM CHLORIDE 75 ML/HR: 900 INJECTION, SOLUTION INTRAVENOUS at 02:05

## 2020-07-19 RX ADMIN — OXYCODONE HYDROCHLORIDE AND ACETAMINOPHEN 500 MG: 500 TABLET ORAL at 08:36

## 2020-07-19 RX ADMIN — ASPIRIN 81 MG: 81 TABLET, COATED ORAL at 08:36

## 2020-07-19 RX ADMIN — Medication 10 ML: at 08:37

## 2020-07-19 RX ADMIN — Medication 10 ML: at 02:05

## 2020-07-19 RX ADMIN — CLOPIDOGREL BISULFATE 75 MG: 75 TABLET ORAL at 08:36

## 2020-07-19 RX ADMIN — ENOXAPARIN SODIUM 40 MG: 40 INJECTION SUBCUTANEOUS at 16:46

## 2020-07-19 RX ADMIN — ATORVASTATIN CALCIUM 20 MG: 20 TABLET, FILM COATED ORAL at 08:36

## 2020-07-19 NOTE — ED PROVIDER NOTES
Subjective   Patient is a 70-year-old male with history of coronary artery disease, MI who presents emergency department complaint of chest pain.  He reports that initially began about 36 hours ago, was initially intermittent, but has worsened and become more constant today.  Reports this pain is worse with breathing, and symptoms with movement.  Denies any shortness of breath.  No abnormal leg swelling or pain.  Denies any fever or chills.  No cough.          Review of Systems   Constitutional: Negative for chills and fever.   Respiratory: Positive for chest tightness. Negative for cough and shortness of breath.    Cardiovascular: Positive for chest pain. Negative for palpitations and leg swelling.   Gastrointestinal: Negative for abdominal pain, diarrhea, nausea and vomiting.   Genitourinary: Negative for dysuria.   Musculoskeletal: Negative for back pain and neck pain.   Skin: Negative.    Neurological: Negative for dizziness, syncope, weakness, light-headedness and headaches.       Past Medical History:   Diagnosis Date   • Coronary artery disease    • Gout    • Hx of colonoscopy 2012    NML   • Hyperglycemia    • Hyperlipidemia    • MI (myocardial infarction) (CMS/[facility])     15 years ago        No Known Allergies    Past Surgical History:   Procedure Laterality Date   • CARDIAC CATHETERIZATION  20111   • CARDIAC CATHETERIZATION N/A 5/18/2020    Procedure: Left Heart Cath with Coronary Angiography;  Surgeon: Virginie Arauz MD;  Location: [facility] CATH INVASIVE LOCATION;  Service: Cardiovascular;  Laterality: N/A;   • CARDIAC CATHETERIZATION N/A 5/18/2020    Procedure: Left ventriculography;  Surgeon: Virginie Arauz MD;  Location: [facility] CATH INVASIVE LOCATION;  Service: Cardiovascular;  Laterality: N/A;   • CORONARY ANGIOPLASTY WITH STENT PLACEMENT  1999   • HERNIA REPAIR      Hernia surgery (Two surgery)   • ROTATOR CUFF REPAIR Right        Family History   Problem Relation Age of Onset   • Hypertension  "Mother    • Diabetes Mother        Social History     Socioeconomic History   • Marital status:      Spouse name: Not on file   • Number of children: Not on file   • Years of education: Not on file   • Highest education level: Not on file   Tobacco Use   • Smoking status: Former Smoker     Last attempt to quit: 1980     Years since quittin.5   • Smokeless tobacco: Never Used   Substance and Sexual Activity   • Alcohol use: Yes     Comment: 2oz   • Drug use: No   • Sexual activity: Defer           Objective   Physical Exam   Constitutional: He is oriented to person, place, and time. He appears well-developed and well-nourished.  Non-toxic appearance. He does not appear ill. No distress.   HENT:   Head: Normocephalic and atraumatic.   Eyes: Pupils are equal, round, and reactive to light. EOM are normal.   Neck: Normal range of motion. Neck supple.   Cardiovascular: Normal rate and regular rhythm. Exam reveals no gallop and no friction rub.   No murmur heard.  Pulses:       Radial pulses are 2+ on the right side, and 2+ on the left side.        Dorsalis pedis pulses are 2+ on the right side, and 2+ on the left side.   Pulmonary/Chest: Effort normal and breath sounds normal.   Abdominal: Soft. Bowel sounds are normal.   Musculoskeletal: Normal range of motion.        Right lower leg: He exhibits no tenderness and no edema.        Left lower leg: He exhibits no tenderness and no edema.   Neurological: He is alert and oriented to person, place, and time.   Skin: Skin is warm and dry. Capillary refill takes less than 2 seconds. No rash noted.   Psychiatric: He has a normal mood and affect. His behavior is normal.   Nursing note and vitals reviewed.      Procedures           ED Course  /96   Pulse 67   Temp 98.3 °F (36.8 °C) (Oral)   Resp 17   Ht 182.9 cm (72\")   Wt 98 kg (216 lb 0.8 oz)   SpO2 97%   BMI 29.30 kg/m²   Labs Reviewed   COMPREHENSIVE METABOLIC PANEL - Abnormal; Notable for the " following components:       Result Value    Glucose 162 (*)     All other components within normal limits    Narrative:     GFR Normal >60  Chronic Kidney Disease <60  Kidney Failure <15     PROTIME-INR - Normal   BNP (IN-HOUSE) - Normal    Narrative:     Among patients with dyspnea, NT-proBNP is highly sensitive for the detection of acute congestive heart failure. In addition NT-proBNP of <300 pg/ml effectively rules out acute congestive heart failure with 99% negative predictive value.    Results may be falsely decreased if patient taking Biotin.     TROPONIN (IN-HOUSE) - Normal    Narrative:     Troponin T Reference Range:  <= 0.03 ng/mL-   Negative for AMI  >0.03 ng/mL-     Abnormal for myocardial necrosis.  Clinicians would have to utilize clinical acumen, EKG, Troponin and serial changes to determine if it is an Acute Myocardial Infarction or myocardial injury due to an underlying chronic condition.       Results may be falsely decreased if patient taking Biotin.     CBC WITH AUTO DIFFERENTIAL - Normal   BUN - Normal   CBC AND DIFFERENTIAL    Narrative:     The following orders were created for panel order CBC & Differential.  Procedure                               Abnormality         Status                     ---------                               -----------         ------                     CBC Auto Differential[[id number]]        Normal              Final result                 Please view results for these tests on the individual orders.     Medications   sodium chloride 0.9 % flush 10 mL (has no administration in time range)   nitroglycerin (NITROSTAT) SL tablet 0.4 mg (0.4 mg Sublingual Given 7/18/20 2227)   aspirin chewable tablet 162 mg (162 mg Oral Given 7/18/20 2137)   Morphine sulfate (PF) injection 4 mg (4 mg Intravenous Given 7/18/20 2303)   iopamidol (ISOVUE-370) 76 % injection 100 mL (100 mL Intravenous Given 7/18/20 2347)     Ct Chest Pulmonary Embolism    Result Date: 7/18/2020  1.  No  evidence of pulmonary embolism or pneumonia. 2.  Very mild mosaic-like round glass infiltrates could be related to mild air trapping. Electronically signed by:  Dhaval Foster M.D.  7/18/2020 10:04 PM      ED Course as of Jul 19 0041   Sat Jul 18, 2020   2250 Discussed with Dr. Kaur, advises CT chest.     [LB]   Sun Jul 19, 2020   0025 Pain has slightly improved with morphine.  He had no change with the nitroglycerin earlier.    [LB]      ED Course User Index  [LB] Tabitha Arzate, APRN                                           MDM  Number of Diagnoses or Management Options  Diagnosis management comments: Co morbidities: Artery disease, hypertension, MI, hyperlipidemia, gout    Differentials: PE, pneumonia, acute coronary syndrome  This list is not all inclusive and does not constitute the entireity of considered causes.     Labs reviewed by me and significant for the following: Glucose 162.  Labs otherwise unremarkable    Imaging, Interpreted per radiologist, independently viewed by myself: No evidence for PE or pneumonia.    Patient was brought back to the emergency department room for evaluation and  placed on appropriate monitoring.  The above work-up was completed.    Plan and Disposition: Admission    Patient is a 70-year-old male with significant cardiac history presents ED with chest pain over the past 36 hours he reports.  He does report his pain is and is worse with movement with breathing.  No relief with nitroglycerin.  He was given morphine which did provide some relief of his chest pain.  His work-up here in the ED reveals no acute findings, however given his cardiac history he will be admitted for further evaluation and monitoring.           Amount and/or Complexity of Data Reviewed  Clinical lab tests: reviewed  Tests in the radiology section of CPT®: reviewed  Tests in the medicine section of CPT®: reviewed  Discuss the patient with other providers: yes  Independent visualization of images,  tracings, or specimens: yes (Imaging was independently viewed by me, and interpreted by the radiologist)    Patient Progress  Patient progress: stable      Final diagnoses:   Chest pain, unspecified type            Tabitha Arzate, APRN  07/19/20 0041

## 2020-07-19 NOTE — PLAN OF CARE
Problem: Patient Care Overview  Goal: Plan of Care Review  Outcome: Ongoing (interventions implemented as appropriate)  Flowsheets  Taken 7/19/2020 0418 by Chris Hicks LPN  Progress: no change  Outcome Summary: Patient settled on the unit with no concerns. Patient slept comfortably all night. Pain managed effectively with PRN morphine  Taken 7/19/2020 0200 by Cleveland Simmons RN  Plan of Care Reviewed With: patient

## 2020-07-19 NOTE — DISCHARGE SUMMARY
"      [facility] Medicine Services  DISCHARGE SUMMARY        Prepared For PCP:  Pilar Carroll MD    Patient Name: Edi Hager  : 1950  MRN: [id number]      Date of Admission:   2020    Date of Discharge:  2020    Length of stay:  LOS: 0 days     Hospital Course     Presenting Problem:   Chest pain, unspecified type [R07.9]      Active Hospital Problems    Diagnosis  POA   • **Chest pain [R07.9]  Yes   • Hyperlipidemia [E78.5]  Yes   • Depression [F32.9]  Yes   • Gout [M10.9]  Yes   • Coronary artery disease [I25.10]  Yes      Resolved Hospital Problems   No resolved problems to display.     Chest pain with known CAD with known blockages per cardiac cath in May, first troponin negative ,  - Cardiology consulted, on aspirin, statin, Plavix, Imdur and atenolol.     Allergic rhinitis - on cetirizine      Diet supp- on Vit C      Hyperglycemia 162 non fasting - A1c in am       Hospital Course:  Edi Hager is a 70 y.o. male with PMH known CAD presents with complaints of constant chest pain past 36 hours. He denies shortness of air, dizziness, nausea or diaphoresis. He denies cough, congestion or fever. He reports pain does not radiate but is worse with activity. He had 3 nitro in ED with no relief. Troponin was negative , EKG shows SR with LAFB. . Serum glucose is 162 non fasting with no known DM2. Review of records shows he had a cardiac cath in May that showed blockage to LAD and medical management was advised    \"20-SUMMARY:Left ventricle size and contractility normal with ejection fraction of 60%.     Left main coronary artery normal.  Left anterior descending artery has diffuse calcification..  Mid to distal segment of the left into descending artery has diffuse 99% disease.  Please note LAD was totally occluded with collateral filling in .  Distal LAD near the apex is totally occluded  Circumflex coronary artery showed luminal " "irregularities.  Right coronary artery has diffuse calcification.  Left ventricle branch and PDA has 50 to 60% proximal disease.     RECOMMENDATIONS:  Maximize medical treatment.  Patient was started on Imdur 60 mg a day and Plavix 75 mg a day.  Continue beta-blocker.  Modification of risk factors.  Patient had totally occluded left anterior descending artery in 2011.  Patient now has diffuse disease in the LAD in the midsegment.  Consideration was given for possible intervention however the benefit of intervention was thought to be less and also in-stent restenosis and occlusion rate probably is higher.  For this reason medical therapy is being considered.\"        CT PE protocol was done in ED :     \"IMPRESSION:  1.  No evidence of pulmonary embolism or pneumonia.  2.  Very mild mosaic-like round glass infiltrates could be related to mild air trapping.\"     Covid-19 testing was ordered and pending. He will be admitted for serial troponin and cardiology consulted given recent cath results.        7/19-doing better today, will dc home    Reasons For Change In Medications and Indications for New Medications:  lortab for pain    Day of Discharge     HPI:       Vital Signs:   Temp:  [98 °F (36.7 °C)-98.5 °F (36.9 °C)] 98.2 °F (36.8 °C)  Heart Rate:  [56-73] 61  Resp:  [16-18] 18  BP: ()/(63-96) 133/69       Physical Exam   Constitutional: He is oriented to person, place, and time. He appears well-developed and well-nourished. No distress.   HENT:   Head: Normocephalic and atraumatic.   Right Ear: External ear normal.   Mouth/Throat: No oropharyngeal exudate.   Eyes: Pupils are equal, round, and reactive to light. Conjunctivae and EOM are normal. Right eye exhibits no discharge. Left eye exhibits no discharge. No scleral icterus.   Neck: Normal range of motion. No thyromegaly present.   Cardiovascular: Normal rate, regular rhythm and normal heart sounds.   Pulmonary/Chest: Effort normal and breath sounds normal. No " respiratory distress.   Abdominal: Soft. Bowel sounds are normal. He exhibits no distension.   Musculoskeletal: Normal range of motion. He exhibits no edema, tenderness or deformity.   Neurological: He is alert and oriented to person, place, and time. No cranial nerve deficit.   Skin: Skin is warm and dry. No rash noted. He is not diaphoretic. No erythema.   Psychiatric: He has a normal mood and affect. His behavior is normal.   Nursing note and vitals reviewed.      Pertinent  and/or Most Recent Results     Results from last 7 days   Lab Units 07/18/20  2132   WBC 10*3/mm3 9.20   HEMOGLOBIN g/dL 15.3   HEMATOCRIT % 43.8   PLATELETS 10*3/mm3 218   SODIUM mmol/L 141   POTASSIUM mmol/L 4.1   CHLORIDE mmol/L 100   CO2 mmol/L 28.0   BUN  15   CREATININE mg/dL 1.01   GLUCOSE mg/dL 162*   CALCIUM mg/dL 9.3     Results from last 7 days   Lab Units 07/18/20  2132   BILIRUBIN mg/dL 0.5   ALK PHOS U/L 66   ALT (SGPT) U/L 15   AST (SGOT) U/L 19   PROTIME Seconds 10.5   INR  1.00           Invalid input(s): TG, LDLCALC, LDLREALC  Results from last 7 days   Lab Units 07/19/20  1600 07/19/20  0940 07/19/20  0427 07/18/20  2132   PROBNP pg/mL  --   --   --  89.8   TROPONIN T ng/mL <0.010 <0.010 <0.010 <0.010       Brief Urine Lab Results     None          Microbiology Results Abnormal     Procedure Component Value - Date/Time    Respiratory Panel PCR w/COVID-19(SARS-CoV-2) AYESHA In-House, [facility] - Swab, Nasopharynx [[id number]]  (Normal) Collected:  07/19/20 0156    Lab Status:  Final result Specimen:  Swab from Nasopharynx Updated:  07/19/20 1304     ADENOVIRUS, PCR Not Detected     Coronavirus 229E Not Detected     Coronavirus HKU1 Not Detected     Coronavirus NL63 Not Detected     Coronavirus OC43 Not Detected     COVID19 Not Detected     Human Metapneumovirus Not Detected     Human Rhinovirus/Enterovirus Not Detected     Influenza A PCR Not Detected     Influenza A H1 Not Detected     Influenza A H1 2009 PCR Not  Detected     Influenza A H3 Not Detected     Influenza B PCR Not Detected     Parainfluenza Virus 1 Not Detected     Parainfluenza Virus 2 Not Detected     Parainfluenza Virus 3 Not Detected     Parainfluenza Virus 4 Not Detected     RSV, PCR Not Detected     Bordetella pertussis pcr Not Detected     Bordetella parapertussis PCR Not Detected     Chlamydophila pneumoniae PCR Not Detected     Mycoplasma pneumo by PCR Not Detected    Narrative:       Fact sheet for providers: https://docs.[employer]/wp-content/uploads/FXC4179-1511-HZ3.1-EUA-Provider-Fact-Sheet-3.pdf    Fact sheet for patients: https://docs.[employer]/wp-content/uploads/GQD3371-6406-QM5.1-EUA-Patient-Fact-Sheet-1.pdf          Xr Chest 1 View    Result Date: 7/19/2020  Impression: Low lung volumes without acute cardiopulmonary abnormality.  Electronically Signed By-Garfield Eugene On:7/19/2020 8:08 AM This report was finalized on [id number] by  Garfield Eugene, .    Ct Chest Pulmonary Embolism    Result Date: 7/18/2020  Impression: 1.  No evidence of pulmonary embolism or pneumonia. 2.  Very mild mosaic-like round glass infiltrates could be related to mild air trapping. Electronically signed by:  Dhaval Foster M.D.  7/18/2020 10:04 PM                Results for orders placed during the hospital encounter of 07/18/20   Adult Transthoracic Echo Complete W/ Cont if Necessary Per Protocol    Narrative · Estimated EF = 60%.  · Left ventricular systolic function is normal.     Indications  Chest pain    Technically satisfactory study.  Mitral valve is structurally normal.  Tricuspid valve is structurally normal.  Aortic valve is structurally normal.  Pulmonic valve could not be well visualized.  No evidence for mitral tricuspid or aortic regurgitation is seen by   Doppler study.  Left atrium is normal in size.  Right atrium is normal in size.  Left ventricle is normal in size and contractility with ejection fraction   of 60%.  Right ventricle is  normal in size.  Atrial septum is intact.  Aorta is normal.  No pericardial effusion or intracardiac thrombus is seen.    Impression  Structurally and functionally normal cardiac valves.  Left ventricular size and contractility is normal with ejection fraction   of 60%                   Test Results Pending at Discharge   Order Current Status    Hemoglobin A1c In process            Procedures Performed           Consults:   Consults     Date and Time Order Name Status Description    7/19/2020 0849 Inpatient Cardiology Consult      7/19/2020 0136 Inpatient Cardiology Consult Completed     7/19/2020 0025 Hospitalist (on-call MD unless specified) Completed         Assessment/Plan      Active Problems:    Chest pain  ////////////////////////  Impression  ===========  -Chest discomfort-atypical.     Stress Cardiolite test 5/14/2020 revealed significant inferior apical and posterior lateral ischemia with reproducible chest discomfort with exercise     -status post myocardial infarction  1999 and stent placement to circumflex coronary artery in 1999 (in [place]) .   last cardiac catheterization 2011 revealed patent circumflex stent.  Totally occluded mid LAD with collateral filling of the distal LAD     Cardiac catheterization 5/18/2020  Left ventricle size and contractility normal with ejection fraction of 60%.  Left main coronary artery normal.  Left anterior descending artery has diffuse calcification..  Mid to distal segment of the left into descending artery has diffuse 99% disease.  Please note LAD was totally occluded with collateral filling in 2011.  Distal LAD near the apex is totally occluded  Circumflex coronary artery showed luminal irregularities.  Right coronary artery has diffuse calcification.  Left ventricle branch and PDA has 50 to 60% proximal disease.      Patient had totally occluded left anterior descending artery in 2011.  Patient now has diffuse disease in the LAD in the midsegment.  Consideration  was given for possible intervention however the benefit of intervention was thought to be less and also in-stent restenosis and occlusion rate probably is higher.  For this reason medical therapy is being considered.     Stress Cardiolite test showed predictable  apical and distal inferior ischemic changes with excellent exercise tolerance.  May 7, 2019      -dyslipidemia      -history of the elevated blood sugars with normal hemoglobin A1c      -status post rotator cuff surgery and double hernia repair      -former smoker  ===========   Plan  =============  Chest pain-atypical.  EKG showed no acute changes.  Troponin levels were negative.  Recent cardiac catheterizations images were reviewed independently.     Recent cardiac catheterization 5/18/2020 revealed  Left ventricle size and contractility normal with ejection fraction of 60%.  Left main coronary artery normal.  Left anterior descending artery has diffuse calcification..  Mid to distal segment of the left into descending artery has diffuse 99% disease.  Please note LAD was totally occluded with collateral filling in 2011.  Distal LAD near the apex is totally occluded  Circumflex coronary artery showed luminal irregularities.  Right coronary artery has diffuse calcification.  Left ventricle branch and PDA has 50 to 60% proximal disease.      Patient had totally occluded left anterior descending artery in 2011.  Patient now has diffuse disease in the LAD in the midsegment.  Consideration was given for possible intervention however the benefit of intervention was thought to be less and also in-stent restenosis and occlusion rate probably is higher.  For this reason medical therapy is being considered.      Medications were reviewed and updated.     Conservative treatment at this time.  If patient has continued symptoms in addition of Ranexa could be considered.     Further plan will depend on patient's progress.  ////////////////////  Virginie Arauz  MD  07/19/20  07:41      Discharge Details        Discharge Medications      New Medications      Instructions Start Date   HYDROcodone-acetaminophen 5-325 MG per tablet  Commonly known as:  Norco   1 tablet, Oral, Every 8 Hours PRN      nitroglycerin 0.4 MG SL tablet  Commonly known as:  NITROSTAT   0.4 mg, Sublingual, Every 5 Minutes PRN, Take no more than 3 doses in 15 minutes.         Continue These Medications      Instructions Start Date   ASPIR 81 MG EC tablet  Generic drug:  aspirin   81 mg, Oral, Daily      atenolol 25 MG tablet  Commonly known as:  TENORMIN   25 mg, Oral, Daily      clopidogrel 75 MG tablet  Commonly known as:  PLAVIX   75 mg, Oral, Every Morning      isosorbide mononitrate 60 MG 24 hr tablet  Commonly known as:  IMDUR   60 mg, Oral, Every Morning      simvastatin 40 MG tablet  Commonly known as:  Zocor   40 mg, Oral, Nightly      vitamin C 500 MG tablet  Commonly known as:  ASCORBIC ACID   500 mg, Oral, Daily      ZyrTEC Allergy 10 MG tablet  Generic drug:  cetirizine   Every 24 Hours             No Known Allergies      Discharge Disposition:  Home or Self Care    Diet:  Hospital:  Diet Order   Procedures   • Diet Cardiac; Healthy Heart         Discharge Activity:         CODE STATUS:    Code Status and Medical Interventions:   Ordered at: 07/19/20 0049     Code Status:    CPR     Medical Interventions (Level of Support Prior to Arrest):    Full         Follow-up Appointments  Future Appointments   Date Time Provider Department Center   8/19/2020  9:30 AM Pilar Carroll MD [facility] None   9/14/2020 12:20 PM Virginie Arauz MD [facility]             Condition on Discharge:      Stable      This patient has been examined wearing appropriate Personal Protective Equipment and discussed with hospital infection control department. 07/19/20      Electronically signed by Rell Arnold MD, 07/19/20, 6:09 PM.      Time: I spent  34  minutes on this discharge activity  which included face-to-face encounter with the patient/reviewing the data in the system/coordination of the care with the nursing staff as well as consultants/documentation/entering orders.

## 2020-07-19 NOTE — ED NOTES
Pt presents with chest pain that started yesterday. Pt reports he wasn't doing anything in particular when they pain started. The pain got worse today, pt reports he took 2 81mg ASA prior to coming in. Pt denies N/V/D. Call light and phone in reach.      Lisbeth Euceda, MARCELLUS  07/18/20 2047

## 2020-07-19 NOTE — H&P
"      [facility] Medicine Services      Patient Name: Edi Hager  : 1950  MRN: [id number]  Primary Care Physician: Pilar Carroll MD  Date of admission: 2020    Patient Care Team:  Pilar Carroll MD as PCP - General (Family Medicine)  Tabitha Brown MD as PCP - Claims Attributed  Virginie Arauz MD as Consulting Physician (Cardiology)          Subjective   History Present Illness     Chief Complaint:   Chief Complaint   Patient presents with   • Chest Pain                70 year old male with PMH known CAD presents with complaints of constant chest pain past 36 hours. He denies shortness of air, dizziness, nausea or diaphoresis. He denies cough, congestion or fever. He reports pain does not radiate but is worse with activity. He had 3 nitro in ED with no relief. Troponin was negative , EKG shows SR with LAFB. . Serum glucose is 162 non fasting with no known DM2. Review of records shows he had a cardiac cath in May that showed blockage to LAD and medical management was advised :    20    \"20    SUMMARY:  Left ventricle size and contractility normal with ejection fraction of 60%.     Left main coronary artery normal.  Left anterior descending artery has diffuse calcification..  Mid to distal segment of the left into descending artery has diffuse 99% disease.  Please note LAD was totally occluded with collateral filling in .  Distal LAD near the apex is totally occluded  Circumflex coronary artery showed luminal irregularities.  Right coronary artery has diffuse calcification.  Left ventricle branch and PDA has 50 to 60% proximal disease.     RECOMMENDATIONS:  Maximize medical treatment.  Patient was started on Imdur 60 mg a day and Plavix 75 mg a day.  Continue beta-blocker.  Modification of risk factors.  Patient had totally occluded left anterior descending artery in .  Patient now has diffuse disease in the LAD in the " "midsegment.  Consideration was given for possible intervention however the benefit of intervention was thought to be less and also in-stent restenosis and occlusion rate probably is higher.  For this reason medical therapy is being considered.\"      CT PE protocol was done in ED :    \"IMPRESSION:  1.  No evidence of pulmonary embolism or pneumonia.  2.  Very mild mosaic-like round glass infiltrates could be related to mild air trapping.\"    Covid-19 testing was ordered and pending. He will be admitted for serial troponin and cardiology consulted given recent cath results.           Review of Systems   Constitution: Negative.   HENT: Negative.    Eyes: Negative.    Cardiovascular: Positive for chest pain.   Respiratory: Negative.    Endocrine: Negative.    Skin: Negative.    Musculoskeletal: Negative.    Gastrointestinal: Negative.    Genitourinary: Negative.    Neurological: Negative.    Psychiatric/Behavioral: Negative.    Allergic/Immunologic: Negative.            Personal History     Past Medical History:   Past Medical History:   Diagnosis Date   • Coronary artery disease    • Gout    • Hx of colonoscopy 2012    NML   • Hyperglycemia    • Hyperlipidemia    • MI (myocardial infarction) (CMS/HCC)     15 years ago        Surgical History:      Past Surgical History:   Procedure Laterality Date   • CARDIAC CATHETERIZATION  20111   • CARDIAC CATHETERIZATION N/A 5/18/2020    Procedure: Left Heart Cath with Coronary Angiography;  Surgeon: Virginie Arauz MD;  Location: [facility] CATH INVASIVE LOCATION;  Service: Cardiovascular;  Laterality: N/A;   • CARDIAC CATHETERIZATION N/A 5/18/2020    Procedure: Left ventriculography;  Surgeon: Virginie Arauz MD;  Location: [facility] CATH INVASIVE LOCATION;  Service: Cardiovascular;  Laterality: N/A;   • CORONARY ANGIOPLASTY WITH STENT PLACEMENT  1999   • HERNIA REPAIR      Hernia surgery (Two surgery)   • ROTATOR CUFF REPAIR Right            Family History: family history includes " Diabetes in his mother; Hypertension in his mother. Otherwise pertinent FHx was reviewed and unremarkable.     Social History:  reports that he quit smoking about 40 years ago. He has never used smokeless tobacco. He reports that he drinks alcohol. He reports that he does not use drugs.      Medications:  Prior to Admission medications    Medication Sig Start Date End Date Taking? Authorizing Provider   aspirin (ASPIR) 81 MG EC tablet ASPIRIN EC LO-DOSE 81 MG ORAL TABLET DELAYED RELEASE 1/22/15   Chayo Dexter MD   atenolol (TENORMIN) 25 MG tablet Take 1 tablet by mouth Daily. 7/17/19   Tabitha Brown MD   cetirizine (ZYRTEC ALLERGY) 10 MG tablet Daily. 5/28/19   Chayo Dexter MD   clopidogrel (PLAVIX) 75 MG tablet Take 75 mg by mouth Every Morning. 5/18/20   Chayo Dexter MD   isosorbide mononitrate (IMDUR) 60 MG 24 hr tablet Take 60 mg by mouth Every Morning. 5/18/20   Chayo Dexter MD   simvastatin (ZOCOR) 40 MG tablet Take 1 tablet by mouth Every Night. 7/17/19   Tabitha Brown MD   vitamin C (ASCORBIC ACID) 500 MG tablet Take 500 mg by mouth Daily.    ProviderChayo MD       Allergies:  No Known Allergies    Objective   Objective     Vital Signs  Temp:  [98.3 °F (36.8 °C)] 98.3 °F (36.8 °C)  Heart Rate:  [65-73] 67  Resp:  [17] 17  BP: (125-146)/(72-96) 141/96  SpO2:  [93 %-98 %] 97 %  on   ;      Body mass index is 29.3 kg/m².    Physical Exam   Constitutional: He is oriented to person, place, and time. He appears well-developed and well-nourished.   HENT:   Head: Normocephalic and atraumatic.   Eyes: EOM are normal.   Neck: Normal range of motion. Neck supple.   Cardiovascular: Normal rate, regular rhythm and normal heart sounds.   Pulmonary/Chest: Effort normal and breath sounds normal.   Abdominal: Soft. Bowel sounds are normal.   Genitourinary:   Genitourinary Comments: deferred   Musculoskeletal: Normal range of motion.      Neurological: He is alert and oriented to person, place, and time.   Skin: Skin is warm and dry.   Psychiatric: He has a normal mood and affect. His behavior is normal. Judgment and thought content normal.   Vitals reviewed.      Results Review:  I have personally reviewed most recent lab results and agree with findings, most notably: troponin.    Results from last 7 days   Lab Units 07/18/20  2132   WBC 10*3/mm3 9.20   HEMOGLOBIN g/dL 15.3   HEMATOCRIT % 43.8   PLATELETS 10*3/mm3 218   INR  1.00     Results from last 7 days   Lab Units 07/18/20  2132   SODIUM mmol/L 141   POTASSIUM mmol/L 4.1   CHLORIDE mmol/L 100   CO2 mmol/L 28.0   BUN  15   CREATININE mg/dL 1.01   GLUCOSE mg/dL 162*   CALCIUM mg/dL 9.3   ALT (SGPT) U/L 15   AST (SGOT) U/L 19   TROPONIN T ng/mL <0.010   PROBNP pg/mL 89.8     Estimated Creatinine Clearance: 83.8 mL/min (by C-G formula based on SCr of 1.01 mg/dL).  Brief Urine Lab Results     None          Microbiology Results (last 10 days)     ** No results found for the last 240 hours. **          ECG/EMG Results (most recent)     Procedure Component Value Units Date/Time    ECG 12 Lead [[id number]] Collected:  07/18/20 2043     Updated:  07/18/20 2045    Narrative:       HEART RATE= 65  bpm  RR Interval= 920  ms  SC Interval= 192  ms  P Horizontal Axis= 12  deg  P Front Axis= 18  deg  QRSD Interval= 98  ms  QT Interval= 403  ms  QRS Axis= -57  deg  T Wave Axis= -3  deg  - ABNORMAL ECG -  Sinus rhythm  Left anterior fascicular block  Electronically Signed By:   Date and Time of Study: 2020-07-18 20:43:47                    Ct Chest Pulmonary Embolism    Result Date: 7/18/2020  1.  No evidence of pulmonary embolism or pneumonia. 2.  Very mild mosaic-like round glass infiltrates could be related to mild air trapping. Electronically signed by:  Dhaval Foster M.D.  7/18/2020 10:04 PM        Estimated Creatinine Clearance: 83.8 mL/min (by C-G formula based on SCr of 1.01 mg/dL).    Assessment/Plan    Assessment/Plan       Active Hospital Problems    Diagnosis  POA   • Chest pain [R07.9]  Yes      Resolved Hospital Problems   No resolved problems to display.     Chest pain with known CAD with known blockages per cardiac cath in May, first troponin negative , serial troponin and cardiology consulted, on aspirin, statin, Plavix, Imdur and atenolol .    Allergic rhinitis - on cetirizine     Diet supp- on Vit C     Hyperglycemia 162 non fasting - A1c in am         VTE Prophylaxis -   Mechanical Order History:     None      Pharmalogical Order History:     Ordered     Dose Route Frequency Stop    Signed and Held  enoxaparin (LOVENOX) syringe 40 mg      40 mg SC Every 24 Hours --          CODE STATUS:    Code Status and Medical Interventions:   Ordered at: 07/19/20 0049     Code Status:    CPR     Medical Interventions (Level of Support Prior to Arrest):    Full       This patient has been examined wearing appropriate Personal Protective Equipment . 07/19/20      I discussed the patient's findings and my recommendations with patient.        Electronically signed by MARICARMEN Oshea, 07/19/20, 12:49 AM.  [facility] Hospitalist Team

## 2020-07-19 NOTE — PLAN OF CARE
Problem: Patient Care Overview  Goal: Plan of Care Review  Outcome: Ongoing (interventions implemented as appropriate)  Flowsheets (Taken 7/19/2020 5293)  Progress: improving  Plan of Care Reviewed With: patient  Outcome Summary: No complaints of pain at this time, no nvd, no SOA, patient had an echo and cardiology consult. Waiting for Arnold to see the patient, unsure if the patient is staying or getting discharged at this time, will monitor

## 2020-07-19 NOTE — CONSULTS
Referring Provider: Rell Arnold MD  Reason for Consultation:  Chest pain  History of coronary artery disease    Patient Care Team:  Pilar Carroll MD as PCP - General (Family Medicine)  Tabitha Brown MD as PCP - Claims Attributed  Virginie Arauz MD as Consulting Physician (Cardiology)    Chief complaint  Chest pain    Subjective .     History of present illness:  Edi Hager is a 70 y.o. male who presents with history of coronary artery disease and other medical issues as outlined in the assessment was admitted with history of chest discomfort for last couple days left precordial sharp like a knife in his chest nonexertional nonradiating.  No associated shortness of breath dizziness sweating or nausea.  No fever chills.  Patient received 3 nitroglycerins in the emergency room with no significant relief.  EKG showed sinus rhythm without any ischemic changes troponin levels are negative.  The chest pain is somewhat sharp in nature moderate.  Patient had a cardiac catheterization in May 2020.  The disease was not amenable for intervention and patient is being treated medically.          ROS      Patient is not having any shortness of breath, palpitations, dizziness or syncope.  Denies having any headache ,abdominal pain ,nausea, vomiting , diarrhea constipation, loss of weight or loss of appetite.  Denies having any excessive bruising ,hematuria or blood in the stool.    Review of all systems negative except as indicated      History  Past Medical History:   Diagnosis Date   • Coronary artery disease    • Gout    • Hx of colonoscopy 2012    NML   • Hyperglycemia    • Hyperlipidemia    • MI (myocardial infarction) (CMS/HCC)     15 years ago        Past Surgical History:   Procedure Laterality Date   • CARDIAC CATHETERIZATION  20111   • CARDIAC CATHETERIZATION N/A 5/18/2020    Procedure: Left Heart Cath with Coronary Angiography;  Surgeon: Virginie Arauz MD;  Location:   [facility] CATH INVASIVE LOCATION;  Service: Cardiovascular;  Laterality: N/A;   • CARDIAC CATHETERIZATION N/A 2020    Procedure: Left ventriculography;  Surgeon: Virginie Arauz MD;  Location: [facility] CATH INVASIVE LOCATION;  Service: Cardiovascular;  Laterality: N/A;   • CORONARY ANGIOPLASTY WITH STENT PLACEMENT     • HERNIA REPAIR      Hernia surgery (Two surgery)   • ROTATOR CUFF REPAIR Right        Family History   Problem Relation Age of Onset   • Hypertension Mother    • Diabetes Mother        Social History     Tobacco Use   • Smoking status: Former Smoker     Last attempt to quit: 1980     Years since quittin.5   • Smokeless tobacco: Never Used   Substance Use Topics   • Alcohol use: Yes     Comment: 2oz   • Drug use: No        Medications Prior to Admission   Medication Sig Dispense Refill Last Dose   • aspirin (ASPIR) 81 MG EC tablet Take 81 mg by mouth Daily.   2020   • atenolol (TENORMIN) 25 MG tablet Take 1 tablet by mouth Daily. 90 tablet 3 2020   • cetirizine (ZYRTEC ALLERGY) 10 MG tablet Daily.   2020   • clopidogrel (PLAVIX) 75 MG tablet Take 75 mg by mouth Every Morning.   2020   • isosorbide mononitrate (IMDUR) 60 MG 24 hr tablet Take 60 mg by mouth Every Morning.   2020   • simvastatin (ZOCOR) 40 MG tablet Take 1 tablet by mouth Every Night. 90 tablet 3 2020   • vitamin C (ASCORBIC ACID) 500 MG tablet Take 500 mg by mouth Daily.   2020         Patient has no known allergies.    Scheduled Meds:  aspirin 81 mg Oral Daily   atenolol 25 mg Oral Daily   atorvastatin 20 mg Oral Daily   cetirizine 10 mg Oral Daily   clopidogrel 75 mg Oral Daily   enoxaparin 40 mg Subcutaneous Daily   isosorbide mononitrate 60 mg Oral Daily   sodium chloride 10 mL Intravenous Q12H   vitamin C 500 mg Oral Daily     Continuous Infusions:  sodium chloride 75 mL/hr Last Rate: 75 mL/hr (20 0416)     PRN Meds:.•  acetaminophen **OR** acetaminophen **OR** acetaminophen  •   "melatonin  •  Morphine  •  nitroglycerin  •  ondansetron **OR** ondansetron  •  [COMPLETED] Insert peripheral IV **AND** sodium chloride  •  sodium chloride    Objective     VITAL SIGNS  Vitals:    07/18/20 2146 07/18/20 2300 07/19/20 0130 07/19/20 0700   BP: 125/76 141/96 146/83 139/82   BP Location:   Left arm    Patient Position:   Lying Lying   Pulse: 73 67 67 60   Resp:   18 16   Temp:   98.4 °F (36.9 °C) 98 °F (36.7 °C)   TempSrc:   Oral Oral   SpO2: 93% 97% 96% 96%   Weight:   97.5 kg (214 lb 15.2 oz)    Height:   182.9 cm (72\")        Flowsheet Rows      First Filed Value   Admission Height  182.9 cm (72\") Documented at 07/18/2020 2036   Admission Weight  98 kg (216 lb 0.8 oz) Documented at 07/18/2020 2036          No intake or output data in the 24 hours ending 07/19/20 0741     TELEMETRY: Sinus rhythm    Physical Exam:  The patient is alert, oriented and in no distress.  Vital signs as noted above.  Head and neck revealed no carotid bruits or jugular venous distention.  No thyromegaly or lymph adenopathy is present  Lungs clear.  No wheezing.  Breath sounds are normal bilaterally.  Heart normal first and second heart sounds.No murmur.  No precordial rub is present.  No gallop is present.  Abdomen soft and nontender.  No organomegaly is present.  Extremities with good peripheral pulses without any pedal edema.  Skin warm and dry.  Musculoskeletal system is grossly normal  CNS grossly normal      Results Review:   I reviewed the patient's new clinical results.  Lab Results (last 24 hours)     Procedure Component Value Units Date/Time    Troponin [[id number]]  (Normal) Collected:  07/19/20 0427    Specimen:  Blood Updated:  07/19/20 0522     Troponin T <0.010 ng/mL     Narrative:       Troponin T Reference Range:  <= 0.03 ng/mL-   Negative for AMI  >0.03 ng/mL-     Abnormal for myocardial necrosis.  Clinicians would have to utilize clinical acumen, EKG, Troponin and serial changes to determine if it is an " Acute Myocardial Infarction or myocardial injury due to an underlying chronic condition.       Results may be falsely decreased if patient taking Biotin.      Hemoglobin A1c [[id number]] Collected:  07/19/20 0427    Specimen:  Blood Updated:  07/19/20 0459    Respiratory Panel PCR w/COVID-19(SARS-CoV-2) AYESHA In-House, NP swab in [facility] - Swab, Nasopharynx [[id number]] Collected:  07/19/20 0156    Specimen:  Swab from Nasopharynx Updated:  07/19/20 0203    BUN [[id number]]  (Normal) Collected:  07/18/20 2132    Specimen:  Blood Updated:  07/18/20 2203     BUN 15 mg/dL     Comprehensive Metabolic Panel [[id number]]  (Abnormal) Collected:  07/18/20 2132    Specimen:  Blood Updated:  07/18/20 2158     Glucose 162 mg/dL      BUN --     Comment: Testing performed by alternate method        Creatinine 1.01 mg/dL      Sodium 141 mmol/L      Potassium 4.1 mmol/L      Chloride 100 mmol/L      CO2 28.0 mmol/L      Calcium 9.3 mg/dL      Total Protein 7.9 g/dL      Albumin 4.60 g/dL      ALT (SGPT) 15 U/L      AST (SGOT) 19 U/L      Alkaline Phosphatase 66 U/L      Total Bilirubin 0.5 mg/dL      eGFR Non African Amer 73 mL/min/1.73      Globulin 3.3 gm/dL      A/G Ratio 1.4 g/dL      BUN/Creatinine Ratio --     Comment: Testing not performed        Anion Gap 13.0 mmol/L     Narrative:       GFR Normal >60  Chronic Kidney Disease <60  Kidney Failure <15      Troponin [[id number]]  (Normal) Collected:  07/18/20 2132    Specimen:  Blood Updated:  07/18/20 2158     Troponin T <0.010 ng/mL     Narrative:       Troponin T Reference Range:  <= 0.03 ng/mL-   Negative for AMI  >0.03 ng/mL-     Abnormal for myocardial necrosis.  Clinicians would have to utilize clinical acumen, EKG, Troponin and serial changes to determine if it is an Acute Myocardial Infarction or myocardial injury due to an underlying chronic condition.       Results may be falsely decreased if patient taking Biotin.      BNP [[id number]]  (Normal) Collected:  07/18/20 2132      Specimen:  Blood Updated:  07/18/20 2156     proBNP 89.8 pg/mL     Narrative:       Among patients with dyspnea, NT-proBNP is highly sensitive for the detection of acute congestive heart failure. In addition NT-proBNP of <300 pg/ml effectively rules out acute congestive heart failure with 99% negative predictive value.    Results may be falsely decreased if patient taking Biotin.      Protime-INR [[id number]]  (Normal) Collected:  07/18/20 2132    Specimen:  Blood Updated:  07/18/20 2142     Protime 10.5 Seconds      INR 1.00    CBC & Differential [[id number]] Collected:  07/18/20 2132    Specimen:  Blood Updated:  07/18/20 2137    Narrative:       The following orders were created for panel order CBC & Differential.  Procedure                               Abnormality         Status                     ---------                               -----------         ------                     CBC Auto Differential[[id number]]        Normal              Final result                 Please view results for these tests on the individual orders.    CBC Auto Differential [[id number]]  (Normal) Collected:  07/18/20 2132    Specimen:  Blood Updated:  07/18/20 2137     WBC 9.20 10*3/mm3      RBC 4.69 10*6/mm3      Hemoglobin 15.3 g/dL      Hematocrit 43.8 %      MCV 93.4 fL      MCH 32.6 pg      MCHC 34.9 g/dL      RDW 13.4 %      RDW-SD 44.2 fl      MPV 8.3 fL      Platelets 218 10*3/mm3      Neutrophil % 62.7 %      Lymphocyte % 24.4 %      Monocyte % 8.9 %      Eosinophil % 3.2 %      Basophil % 0.8 %      Neutrophils, Absolute 5.70 10*3/mm3      Lymphocytes, Absolute 2.20 10*3/mm3      Monocytes, Absolute 0.80 10*3/mm3      Eosinophils, Absolute 0.30 10*3/mm3      Basophils, Absolute 0.10 10*3/mm3      nRBC 0.0 /100 WBC           Imaging Results (Last 24 Hours)     Procedure Component Value Units Date/Time    XR Chest 1 View [[id number]] Resulted:  07/19/20 0035     Updated:  07/19/20 0036    CT Chest Pulmonary Embolism  [[id number]] Collected:  07/18/20 2201     Updated:  07/19/20 0005    Narrative:       EXAMINATION: CT ANGIOGRAM CHEST WITH IV CONTRAST       DATE OF EXAMINATION:  July 18, 2020    INDICATION: Chest pain    COMPARISON: None available.    PROCEDURE: 100 ml of Isovue 370 contrast were administered intravenously during arterial phase axial CT chest imaging, with 2-D sagittal and coronal and 3-D MIP reformations.  CT dose lowering techniques were used, to include: automated exposure control,   adjustment for patient size, and or use of iterative reconstruction.    FINDINGS:    Cardiovascular: No pulmonary embolism is identified. Aorta and great vessels exhibit no aneurysm or dissection.    Mediastinum/Collette:  No mediastinal or hilar mass or significant lymphadenopathy identified.    Lungs/Pleura :  No infiltrates or effusions are identified.  Very mild mosaic-like groundglass pattern No effusion, pleural mass, thickening or pneumothorax.    Chest wall and Axilla: Normal.    Breast Tissue: Symmetric.  No masses.    Bones:  Unremarkable.    Upper abdomen: Unremarkable.    Additional significant findings: None.        Impression:       1.  No evidence of pulmonary embolism or pneumonia.  2.  Very mild mosaic-like round glass infiltrates could be related to mild air trapping.    Electronically signed by:  Dhaval Foster M.D.    7/18/2020 10:04 PM      LAB RESULTS (LAST 7 DAYS)    CBC  Results from last 7 days   Lab Units 07/18/20  2132   WBC 10*3/mm3 9.20   RBC 10*6/mm3 4.69   HEMOGLOBIN g/dL 15.3   HEMATOCRIT % 43.8   MCV fL 93.4   PLATELETS 10*3/mm3 218       BMP  Results from last 7 days   Lab Units 07/18/20  2132   SODIUM mmol/L 141   POTASSIUM mmol/L 4.1   CHLORIDE mmol/L 100   CO2 mmol/L 28.0   BUN  15   CREATININE mg/dL 1.01   GLUCOSE mg/dL 162*       CMP Results from last 7 days   Lab Units 07/18/20  2132   SODIUM mmol/L 141   POTASSIUM mmol/L 4.1   CHLORIDE mmol/L 100   CO2 mmol/L 28.0   BUN  15   CREATININE mg/dL  1.01   GLUCOSE mg/dL 162*   ALBUMIN g/dL 4.60   BILIRUBIN mg/dL 0.5   ALK PHOS U/L 66   AST (SGOT) U/L 19   ALT (SGPT) U/L 15         BNP        TROPONIN  Results from last 7 days   Lab Units 07/19/20  0427   TROPONIN T ng/mL <0.010       CoAg  Results from last 7 days   Lab Units 07/18/20  2132   INR  1.00       Creatinine Clearance  Estimated Creatinine Clearance: 83.6 mL/min (by C-G formula based on SCr of 1.01 mg/dL).    ABG        Radiology  Ct Chest Pulmonary Embolism    Result Date: 7/18/2020  1.  No evidence of pulmonary embolism or pneumonia. 2.  Very mild mosaic-like round glass infiltrates could be related to mild air trapping. Electronically signed by:  Dhaval Foster M.D.  7/18/2020 10:04 PM              EKG          I personally viewed and interpreted the patient's EKG/Telemetry data: Normal sinus rhythm normal ECG    ECHOCARDIOGRAM:                 Cardiolite (Tc-99m Sestamibi) stress test      OTHER:     Assessment/Plan     Active Problems:    Chest pain    ////////////////////////  Impression  ===========  -Chest discomfort-atypical.    Stress Cardiolite test 5/14/2020 revealed significant inferior apical and posterior lateral ischemia with reproducible chest discomfort with exercise     -status post myocardial infarction  1999 and stent placement to circumflex coronary artery in 1999 (in [place]) .   last cardiac catheterization 2011 revealed patent circumflex stent.  Totally occluded mid LAD with collateral filling of the distal LAD     Cardiac catheterization 5/18/2020  Left ventricle size and contractility normal with ejection fraction of 60%.  Left main coronary artery normal.  Left anterior descending artery has diffuse calcification..  Mid to distal segment of the left into descending artery has diffuse 99% disease.  Please note LAD was totally occluded with collateral filling in 2011.  Distal LAD near the apex is totally occluded  Circumflex coronary artery showed luminal  irregularities.  Right coronary artery has diffuse calcification.  Left ventricle branch and PDA has 50 to 60% proximal disease.      Patient had totally occluded left anterior descending artery in 2011.  Patient now has diffuse disease in the LAD in the midsegment.  Consideration was given for possible intervention however the benefit of intervention was thought to be less and also in-stent restenosis and occlusion rate probably is higher.  For this reason medical therapy is being considered.     Stress Cardiolite test showed predictable  apical and distal inferior ischemic changes with excellent exercise tolerance.  May 7, 2019      -dyslipidemia      -history of the elevated blood sugars with normal hemoglobin A1c      -status post rotator cuff surgery and double hernia repair      -former smoker  ===========   Plan  =============  Chest pain-atypical.  EKG showed no acute changes.  Troponin levels were negative.  Recent cardiac catheterizations images were reviewed independently.    Recent cardiac catheterization 5/18/2020 revealed  Left ventricle size and contractility normal with ejection fraction of 60%.  Left main coronary artery normal.  Left anterior descending artery has diffuse calcification..  Mid to distal segment of the left into descending artery has diffuse 99% disease.  Please note LAD was totally occluded with collateral filling in 2011.  Distal LAD near the apex is totally occluded  Circumflex coronary artery showed luminal irregularities.  Right coronary artery has diffuse calcification.  Left ventricle branch and PDA has 50 to 60% proximal disease.      Patient had totally occluded left anterior descending artery in 2011.  Patient now has diffuse disease in the LAD in the midsegment.  Consideration was given for possible intervention however the benefit of intervention was thought to be less and also in-stent restenosis and occlusion rate probably is higher.  For this reason medical therapy is  being considered.      Medications were reviewed and updated.    Conservative treatment at this time.  If patient has continued symptoms in addition of Ranexa could be considered.    Further plan will depend on patient's progress.  ////////////////////////       Virginie Arauz MD  07/19/20  07:41

## 2020-07-20 ENCOUNTER — TRANSITIONAL CARE MANAGEMENT TELEPHONE ENCOUNTER (OUTPATIENT)
Dept: CALL CENTER | Facility: HOSPITAL | Age: 70
End: 2020-07-20

## 2020-07-20 LAB — HBA1C MFR BLD: 6.9 % (ref 3.5–5.6)

## 2020-07-20 NOTE — OUTREACH NOTE
Call Center TCM Note      Responses   [facility] patient discharged from?  [facility]   COVID-19 Test Status  Negative   Does the patient have one of the following disease processes/diagnoses(primary or secondary)?  Other   TCM attempt successful?  Yes   Call start time  1554   Call end time  1558   Discharge diagnosis  chest pain, known CAD, continue medical management   Meds reviewed with patient/caregiver?  Yes   Is the patient having any side effects they believe may be caused by any medication additions or changes?  No   Does the patient have all medications ordered at discharge?  Yes   Is the patient taking all medications as directed (includes completed medication regime)?  Yes   Does the patient have a primary care provider?   Yes   Does the patient have an appointment with their PCP within 7 days of discharge?  No   Comments regarding PCP  PCP:  Pilar Carroll MD- patient declines appt to be made for followup at this time.    Nursing Interventions  Educated patient on importance of making appointment, Advised patient to make appointment   Has the patient kept scheduled appointments due by today?  N/A   Comments  Patient very appreciative of call, but declines needing any appts.    Has home health visited the patient within 72 hours of discharge?  N/A   Psychosocial issues?  No   Comments  No further chest pain.    Did the patient receive a copy of their discharge instructions?  Yes   Nursing interventions  Reviewed instructions with patient   What is the patient's perception of their health status since discharge?  Improving   Is the patient/caregiver able to teach back signs and symptoms related to disease process for when to call PCP?  Yes   Is the patient/caregiver able to teach back signs and symptoms related to disease process for when to call 911?  Yes   Is the patient/caregiver able to teach back the hierarchy of who to call/visit for symptoms/problems? PCP, Specialist, Home health nurse,  Urgent Care, ED, 911  Yes   TCM call completed?  Yes          Tyrese Alexis RN    7/20/2020, 15:58

## 2020-07-20 NOTE — OUTREACH NOTE
Prep Survey      Responses   [facility] facility patient discharged from?  [facility]   Is LACE score < 7 ?  Yes   Eligibility  [facility]   Hospital  [facility]   Date of Admission  07/18/20   Date of Discharge  07/19/20   Discharge Disposition  Home or Self Care   Discharge diagnosis  chest pain, known CAD, continue medical management   COVID-19 Test Status  Negative   Does the patient have one of the following disease processes/diagnoses(primary or secondary)?  Other   Does the patient have Home health ordered?  No   Is there a DME ordered?  No   Prep survey completed?  Yes          Ava Saldivar RN

## 2020-07-24 ENCOUNTER — TELEPHONE (OUTPATIENT)
Dept: FAMILY MEDICINE CLINIC | Facility: CLINIC | Age: 70
End: 2020-07-24

## 2020-07-24 NOTE — TELEPHONE ENCOUNTER
Called patient to get schedule for TCM appointment and no answer so left voice mail. I would need the patient appointment before aug 2 cause that is the 14 day period

## 2020-07-27 ENCOUNTER — OFFICE VISIT (OUTPATIENT)
Dept: FAMILY MEDICINE CLINIC | Facility: CLINIC | Age: 70
End: 2020-07-27

## 2020-07-27 VITALS
BODY MASS INDEX: 28.85 KG/M2 | HEIGHT: 72 IN | WEIGHT: 213 LBS | DIASTOLIC BLOOD PRESSURE: 70 MMHG | OXYGEN SATURATION: 95 % | HEART RATE: 60 BPM | TEMPERATURE: 97.1 F | SYSTOLIC BLOOD PRESSURE: 105 MMHG

## 2020-07-27 DIAGNOSIS — Z09 HOSPITAL DISCHARGE FOLLOW-UP: ICD-10-CM

## 2020-07-27 DIAGNOSIS — I24.0 ISCHEMIC HEART DISEASE DUE TO CORONARY ARTERY OBSTRUCTION (HCC): Primary | ICD-10-CM

## 2020-07-27 DIAGNOSIS — E11.9 DIET-CONTROLLED DIABETES MELLITUS (HCC): ICD-10-CM

## 2020-07-27 DIAGNOSIS — I25.9 ISCHEMIC HEART DISEASE DUE TO CORONARY ARTERY OBSTRUCTION (HCC): Primary | ICD-10-CM

## 2020-07-27 PROCEDURE — 99495 TRANSJ CARE MGMT MOD F2F 14D: CPT | Performed by: FAMILY MEDICINE

## 2020-07-27 RX ORDER — ATENOLOL 25 MG/1
25 TABLET ORAL DAILY
Qty: 90 TABLET | Refills: 1 | Status: SHIPPED | OUTPATIENT
Start: 2020-07-27

## 2020-07-27 RX ORDER — SIMVASTATIN 40 MG
40 TABLET ORAL NIGHTLY
Qty: 90 TABLET | Refills: 1 | Status: SHIPPED | OUTPATIENT
Start: 2020-07-27

## 2020-07-27 NOTE — PATIENT INSTRUCTIONS
"Managing Your Hypertension  Hypertension is commonly called high blood pressure. This is when the force of your blood pressing against the walls of your arteries is too strong. Arteries are blood vessels that carry blood from your heart throughout your body. Hypertension forces the heart to work harder to pump blood, and may cause the arteries to become narrow or stiff. Having untreated or uncontrolled hypertension can cause heart attack, stroke, kidney disease, and other problems.  What are blood pressure readings?  A blood pressure reading consists of a higher number over a lower number. Ideally, your blood pressure should be below 120/80. The first (\"top\") number is called the systolic pressure. It is a measure of the pressure in your arteries as your heart beats. The second (\"bottom\") number is called the diastolic pressure. It is a measure of the pressure in your arteries as the heart relaxes.  What does my blood pressure reading mean?  Blood pressure is classified into four stages. Based on your blood pressure reading, your health care provider may use the following stages to determine what type of treatment you need, if any. Systolic pressure and diastolic pressure are measured in a unit called mm Hg.  Normal  · Systolic pressure: below 120.  · Diastolic pressure: below 80.  Elevated  · Systolic pressure: 120-129.  · Diastolic pressure: below 80.  Hypertension stage 1  · Systolic pressure: 130-139.  · Diastolic pressure: 80-89.  Hypertension stage 2  · Systolic pressure: 140 or above.  · Diastolic pressure: 90 or above.  What health risks are associated with hypertension?  Managing your hypertension is an important responsibility. Uncontrolled hypertension can lead to:  · A heart attack.  · A stroke.  · A weakened blood vessel (aneurysm).  · Heart failure.  · Kidney damage.  · Eye damage.  · Metabolic syndrome.  · Memory and concentration problems.  What changes can I make to manage my " hypertension?  Hypertension can be managed by making lifestyle changes and possibly by taking medicines. Your health care provider will help you make a plan to bring your blood pressure within a normal range.  Eating and drinking    · Eat a diet that is high in fiber and potassium, and low in salt (sodium), added sugar, and fat. An example eating plan is called the DASH (Dietary Approaches to Stop Hypertension) diet. To eat this way:  ? Eat plenty of fresh fruits and vegetables. Try to fill half of your plate at each meal with fruits and vegetables.  ? Eat whole grains, such as whole wheat pasta, brown rice, or whole grain bread. Fill about one quarter of your plate with whole grains.  ? Eat low-fat diary products.  ? Avoid fatty cuts of meat, processed or cured meats, and poultry with skin. Fill about one quarter of your plate with lean proteins such as fish, chicken without skin, beans, eggs, and tofu.  ? Avoid premade and processed foods. These tend to be higher in sodium, added sugar, and fat.  · Reduce your daily sodium intake. Most people with hypertension should eat less than 1,500 mg of sodium a day.  · Limit alcohol intake to no more than 1 drink a day for nonpregnant women and 2 drinks a day for men. One drink equals 12 oz of beer, 5 oz of wine, or 1½ oz of hard liquor.  Lifestyle  · Work with your health care provider to maintain a healthy body weight, or to lose weight. Ask what an ideal weight is for you.  · Get at least 30 minutes of exercise that causes your heart to beat faster (aerobic exercise) most days of the week. Activities may include walking, swimming, or biking.  · Include exercise to strengthen your muscles (resistance exercise), such as weight lifting, as part of your weekly exercise routine. Try to do these types of exercises for 30 minutes at least 3 days a week.  · Do not use any products that contain nicotine or tobacco, such as cigarettes and e-cigarettes. If you need help quitting,  ask your health care provider.  · Control any long-term (chronic) conditions you have, such as high cholesterol or diabetes.  Monitoring  · Monitor your blood pressure at home as told by your health care provider. Your personal target blood pressure may vary depending on your medical conditions, your age, and other factors.  · Have your blood pressure checked regularly, as often as told by your health care provider.  Working with your health care provider  · Review all the medicines you take with your health care provider because there may be side effects or interactions.  · Talk with your health care provider about your diet, exercise habits, and other lifestyle factors that may be contributing to hypertension.  · Visit your health care provider regularly. Your health care provider can help you create and adjust your plan for managing hypertension.  Will I need medicine to control my blood pressure?  Your health care provider may prescribe medicine if lifestyle changes are not enough to get your blood pressure under control, and if:  · Your systolic blood pressure is 130 or higher.  · Your diastolic blood pressure is 80 or higher.  Take medicines only as told by your health care provider. Follow the directions carefully. Blood pressure medicines must be taken as prescribed. The medicine does not work as well when you skip doses. Skipping doses also puts you at risk for problems.  Contact a health care provider if:  · You think you are having a reaction to medicines you have taken.  · You have repeated (recurrent) headaches.  · You feel dizzy.  · You have swelling in your ankles.  · You have trouble with your vision.  Get help right away if:  · You develop a severe headache or confusion.  · You have unusual weakness or numbness, or you feel faint.  · You have severe pain in your chest or abdomen.  · You vomit repeatedly.  · You have trouble breathing.  Summary  · Hypertension is when the force of blood pumping  through your arteries is too strong. If this condition is not controlled, it may put you at risk for serious complications.  · Your personal target blood pressure may vary depending on your medical conditions, your age, and other factors. For most people, a normal blood pressure is less than 120/80.  · Hypertension is managed by lifestyle changes, medicines, or both. Lifestyle changes include weight loss, eating a healthy, low-sodium diet, exercising more, and limiting alcohol.  This information is not intended to replace advice given to you by your health care provider. Make sure you discuss any questions you have with your health care provider.  Document Released: 09/11/2013 Document Revised: 04/10/2020 Document Reviewed: 11/15/2017  [employer] Patient Education © 2020 [employer]    Physical Activity With Heart Disease  Being active has many benefits, especially if you have heart disease. Physical activity can help you do more and feel healthier. Start slowly, and increase the amount of time you spend being active. Most adults should aim for physical activity that:  · Makes you breathe harder and raises your heart rate (aerobic activity). Try to get at least 150 minutes of aerobic activity each week. This is about 30 minutes each day, 5 days a week.  · Helps build muscle strength (strengthening activity). Do this at least 2 times a week.  Always talk with your health care provider before starting any new activity program or if you have any changes in your condition.  What are the benefits of physical activity?  When you have heart disease, physical activity can help:  · Lower your blood pressure.  · Lower your cholesterol.  · Control your weight.  · Improve your sleep.  · Help control your blood sugar.  · Improve your heart and lung function.  · Reduce your risk for blood clots (thrombophlebitis).  · Improve your energy level.  · Reduce stress.  What are some types of physical activity I could try?  There are  many ways to be active. Talk with your health care provider about what types and intensity of activity is right for you.  Aerobic activity    Aerobic (cardiovascular) activity can be moderate or vigorous intensity, depending on how hard you are working.  Moderate-intensity activity includes:  · Walking.  · Slow bicycling.  · Water aerobics.  · Dancing.  · Light gardening or house work.  Vigorous-intensity activity includes:  · Jogging or running.  · Stair climbing.  · Swimming laps.  · Hiking uphill.  · Heavy gardening, such as digging trenches.    Strengthening activity  Strengthening activities work your muscles to build strength. Some examples include:  · Doing push-ups, sit-ups, or pull-ups.  · Lifting small weights.  · Using resistance bands.    Flexibility  Flexibility activities lengthen your muscles to keep them flexible and less tight and improve your balance. Some examples include:  · Stretching.  · Yoga.  · Chuckie chi.  · Ballet barre.    Follow these instructions at home:  How to get started  · Talk with your health care provider about:  ? What types of activities are safe for you.  ? If you should check your pulse or take other precautions during physical activity.  · Get a calendar. Write down a schedule and plan for your new routine.  · Take time to find out what works for you. Consider:  ? Joining a community program, such as a biking group, yoga class, local gym, or swimming pool membership.  ? Be active on your own by downloading free workout applications on a smartphone or other devices, or by purchasing workout DVDs.  · If you have not been active, begin with sessions that last 10-15 minutes. Gradually work up to sessions that last 20-30 minutes, 5 times a week. Follow all of your health care provider's recommendations.  · Be patient with yourself. It takes time to build up strength and lung capacity.  Safety  · Exercise in an indoor, climate-controlled facility, as told by your health care  provider. You may need to do this if:  ? There are extreme outdoor conditions, such as heat, humidity, or cold.  ? There is an air pollution advisory. Your local news, board of health, or hospital can provide information on air quality.  · Take extra precautions as told by your health care provider. This may include:  ? Monitoring your heart rate.  ? Avoiding heavy lifting.  ? Understanding how your medicines can affect you during physical activity. Certain medicines may cause heat intolerance or changes in blood sugar.  ? Slowing down to rest when you need to.  ? Keeping nitroglycerin spray and tablets with you at all times if you have angina. Use them as told to prevent and treat symptoms.  · Drink plenty of water before, during, and after physical activity.  · Know what symptoms may be signs of a problem. Stop physical activity right away if you have any of these symptoms.  Get help right away if you have any of the following during exercise:  · Chest pain, shortness of breath, or feel very tired.  · Pain in the arm, shoulder, neck, or jaw.  · Feel weak, dizzy, or light-headed.  · An irregular heart rate, or your heart rate is greater than 100 beats per minute (bpm) before exercise.  These symptoms may represent a serious problem that is an emergency. Do not wait to see if the symptoms go away. Get medical help right away. Call your local emergency services (938 in the [place] Do not drive yourself to the hospital.   Summary  · Physical activity has many benefits, especially if you have heart disease.  · Before starting an activity program, talk with your health care provider about how often to be active and what type of activity is safe for you.  · Your physical activity plan may include moderate or vigorous aerobic activity, strengthening activities, and flexibility.  · Know what symptoms may be signs of a problem. Stop physical activity right away and call emergency services (911 in the [place] if you have any of  these symptoms.  This information is not intended to replace advice given to you by your health care provider. Make sure you discuss any questions you have with your health care provider.  Document Released: 07/15/2015 Document Revised: 01/09/2019 Document Reviewed: 01/09/2019  [employer] Patient Education © 2020 [employer]    Preventing Heart Failure  Heart failure is a condition in which the heart has trouble pumping blood. This may mean that the heart cannot pump enough blood out to the body, or that the heart does not fill up with enough blood. Either of those problems can lead to symptoms such as fatigue, trouble breathing, and swelling throughout the body.  This is a common medical condition that affects not only the heart, but the entire body. Making certain nutrition and lifestyle changes can help you prevent heart failure and avoid serious health problems.  What nutrition changes can be made?    · If you are overweight or obese, reduce how many calories you eat each day so that you lose weight. Work with your health care provider or a diet and nutrition specialist (dietitian) to determine how many calories you need each day.  · Eat foods that are low in salt (sodium). Avoid adding extra salt to foods.  · Eat a well-balanced diet that includes a lot of:  ? Fresh fruits and vegetables.  ? Whole grains.  ? Lean meats.  ? Beans.  ? Fat-free or low-fat dairy products.  · Avoid foods that contain a lot of:  ? Trans fats.  ? Saturated fats.  ? Sugar.  ? Cholesterol.  What lifestyle changes can be made?    · Do not use any products that contain nicotine or tobacco, such as cigarettes and e-cigarettes. If you need help quitting or reducing how much you smoke, ask your health care provider.  · Stop using alcohol, or limit alcohol intake to no more than 1 drink a day for nonpregnant women and 2 drinks a day for men. One drink equals 12 oz of beer, 5 oz of wine, or 1½ oz of hard liquor.  · Exercise for at least 150  minutes each week, or as much as told by your health care provider.  ? Do moderate-intensity exercise, such as brisk walking, bicycling, or water aerobics.  ? Ask your health care provider which activities are safe for you.  · See a health care provider regularly for screening and wellness checks. Know your heart health indicators, such as:  ? Blood pressure.  ? Cholesterol levels.  ? Blood sugar (glucose) levels.  ? Weight and BMI.  · If you have diabetes, manage your condition and follow your treatment plan as instructed.  · Try to get 7-9 hours of sleep each night. To help with sleep:  ? Keep your bedroom cool and dark.  ? Do not eat a heavy meal during the hour before you go to bed.  ? Do not drink alcohol or caffeinated drinks before bed.  ? Avoid screen time before bedtime. This means avoiding television, computers, tablets, and cell phones.  · Find ways to relax and manage stress. These may include:  ? Breathing exercises.  ? Meditation.  ? Yoga.  ? Listening to music.  Why are these changes important?  · A well-balanced diet with the appropriate amount of calories can keep your body weight at a healthy level, which reduces strain on your heart.  · A low-sodium diet can help keep your blood pressure in a normal range and keep your blood vessels working properly.  · Quitting smoking and limiting alcohol intake can reduce harmful effects that these substances have on your heart and blood vessels.  · Regular exercise can keep your heart strong so it can pump blood normally.  · Managing diabetes helps your blood circulate and can help you maintain a healthy weight.  · Managing stress helps to reduce the risk of high blood pressure and heart problems.  What can happen if changes are not made?  Heart failure can cause very serious problems that may get worse over time, such as:  · Extreme fatigue during normal physical activities.  · Shortness of breath or trouble breathing.  · Swelling in your abdomen, legs,  ankles, feet, or neck.  · Fluid buildup throughout the body.  · Weight gain.  · Cough.  · Frequent urination.  What can I do to lower my risk?  You may be able to lower your risk of heart failure by:  · Losing weight or keeping your weight under control.  · Working with your health care provider to manage your:  ? Cholesterol.  ? Blood pressure.  ? Diabetes, if this applies.  · Eating a healthy diet.  · Exercising regularly.  · Avoiding unhealthy habits, such as smoking, drinking, or using drugs.  · Getting plenty of sleep.  · Managing your stress.  How is this treated?  Heart failure cannot be cured except by heart transplant, but treatment can help to improve your quality of life. Treatment may include:  · Medicines to help:  ? Lower blood pressure.  ? Remove excess sodium from your body.  ? Relax blood vessels.  ? Improve heart function.  ? Control other symptoms of heart failure.  · Surgery to open blocked coronary arteries or repair damaged heart valves.  · Implantation of a biventricular pacemaker to improve heart muscle function (cardiac resynchronization therapy). This device paces both the right ventricle and left ventricle.  · Implantation of a device to treat serious abnormal heart rhythms (implantable cardioverter defibrillator, ICD).  · Implantation of a mechanical heart pump to improve the pumping ability of your heart (left ventricular assist device, LVAD).  · Heart transplant. This treatment is considered for certain people who do not improve with other treatments.  Where to find more information  · National Heart, Lung, [employer] [facility]: www.nhlbi.nih.gov/health/health-topics/topics/hf  · Centers for Disease Control and Prevention: www.cdc.gov/dhdsp/data_statistics/fact_sheets/fs_heart_failure.htm  · [facility]: [facility].gov/heartfailure/heartfailuredefined/01.html  · American Heart Association:  www.heart.org/HEARTORG/Conditions/HeartFailure/Heart-Failure_UC_002019_SubHomePage.jsp  Contact a health care provider if:  · You have rapid weight gain.  · You have increasing shortness of breath that is unusual for you.  · You tire easily, or you are unable to participate in your usual activities.  · You cough more than normal, especially with physical activity.  · You have any swelling or more swelling in areas such as your hands, feet, ankles, or abdomen.  Summary  · Heart failure can be prevented by making changes to your diet and your lifestyle.  · It is important to eat a healthy diet, manage your weight, exercise regularly, manage stress, avoid drugs and alcohol, and keep your cholesterol and blood pressure under control.  · Heart failure can cause very serious problems over time.  This information is not intended to replace advice given to you by your health care provider. Make sure you discuss any questions you have with your health care provider.  Document Released: 08/08/2017 Document Revised: 04/10/2020 Document Reviewed: 08/08/2017  [employer]vier Patient Education © 2020 [employer]

## 2020-07-27 NOTE — PROGRESS NOTES
Transitional Care Follow Up Visit  Subjective     Edi Hager is a 70 y.o. male who presents for a transitional care management visit.    Within 48 business hours after discharge our office contacted him via telephone to coordinate his care and needs.      I reviewed and discussed the details of that call along with the discharge summary, hospital problems, inpatient lab results, inpatient diagnostic studies, and consultation reports with Edi.     Current outpatient and discharge medications have been reconciled for the patient.  Reviewed by: Pilar Carroll MD      Date of TCM Phone Call 7/19/2020   [facility]   Date of Admission 7/18/2020   Date of Discharge 7/19/2020   Discharge Disposition Home or Self Care     Risk for Readmission (LACE) Score: 2 (7/19/2020  6:01 AM)      History of Present Illness   Course During Hospital Stay:  70-year-old  male with a past medical history of tobacco use and a concurrent medical history of coronary artery disease status post PCI of the LAD was seen in the ED on July 18, 2020 with complaints of left-sided chest pain concerning for ACS.  Patient underwent a left heart catheterization in May 2020 for similar symptoms and was found to have 100% occlusion of the LAD.  At that time recommendations were made for maximizing medical management.  Patient reports compliance with his beta-blocker, atenolol 25 mg daily, statin, simvastatin 40 mg nightly, dual antiplatelet therapy, and Imdur 60 mg daily for angina.  Troponins were trended and negative x3.  Chest pain was believed to be related to musculoskeletal pain.  Patient reports gradual improvement of chest pain and ultimate resolution.  Reports occasional chest heaviness with exertion.  Patient plans on restarting his exercise regimen.  Patient is normotensive in office.    Of note, hemoglobin A1c was checked during hospital course.  Patient was found to be diabetic with a hemoglobin A1c of 6.9%.      The  "following portions of the patient's history were reviewed and updated as appropriate: allergies, current medications, past family history, past medical history, past social history, past surgical history and problem list.    Review of Systems   Constitutional: Negative for chills, diaphoresis, fatigue and fever.   HENT: Negative for congestion, rhinorrhea, sneezing and sore throat.    Respiratory: Negative for cough and shortness of breath.    Cardiovascular: Negative for chest pain and leg swelling.   Gastrointestinal: Negative for abdominal pain, constipation, diarrhea, nausea and vomiting.   Genitourinary: Negative for difficulty urinating and hematuria.   Musculoskeletal: Negative for gait problem and joint swelling.   Skin: Negative for rash and wound.   Neurological: Negative for seizures, syncope and headaches.   Psychiatric/Behavioral: Negative for confusion and sleep disturbance.       Objective    Vitals:    07/27/20 1149   BP: 105/70   BP Location: Left arm   Patient Position: Sitting   Cuff Size: Small Adult   Pulse: 60   Temp: 97.1 °F (36.2 °C)   TempSrc: Infrared   SpO2: 95%   Weight: 96.6 kg (213 lb)   Height: 182.9 cm (72\")       Physical Exam   Constitutional: He is oriented to person, place, and time. He appears well-developed and well-nourished. No distress.   HENT:   Head: Normocephalic and atraumatic.   Right Ear: Tympanic membrane and ear canal normal.   Left Ear: Tympanic membrane and ear canal normal.   Nose: Nose normal.   Mouth/Throat: Mucous membranes are normal.   Brown staining of the tongue.   Eyes: Pupils are equal, round, and reactive to light. Conjunctivae and EOM are normal. No scleral icterus.   Neck: Normal range of motion.   Cardiovascular: Normal rate, regular rhythm, normal heart sounds and intact distal pulses.   Pulmonary/Chest: Effort normal and breath sounds normal. He has no wheezes. He has no rales.   Abdominal: Soft. Bowel sounds are normal. There is no tenderness. "   Musculoskeletal: He exhibits no edema or tenderness.   Neurological: He is alert and oriented to person, place, and time. He has normal strength.   Skin: Skin is warm and dry. No rash noted. He is not diaphoretic.   Psychiatric: He has a normal mood and affect. His behavior is normal.   Vitals reviewed.      Lab Results   Component Value Date    WBC 9.20 07/18/2020    HGB 15.3 07/18/2020    HCT 43.8 07/18/2020    MCV 93.4 07/18/2020     07/18/2020     Lab Results   Component Value Date    GLUCOSE 162 (H) 07/18/2020    BUN  07/18/2020      Comment:      Testing performed by alternate method    BUN 15 07/18/2020    CREATININE 1.01 07/18/2020    EGFRIFNONA 73 07/18/2020    BCR  07/18/2020      Comment:      Testing not performed    K 4.1 07/18/2020    CO2 28.0 07/18/2020    CALCIUM 9.3 07/18/2020    ALBUMIN 4.60 07/18/2020    LABIL2 1.4 07/05/2018    AST 19 07/18/2020    ALT 15 07/18/2020     Lab Results   Component Value Date    HGBA1C 6.9 (H) 07/19/2020     Lab Results   Component Value Date    CHOL 199 05/15/2020    TRIG 120 05/15/2020    HDL 50 05/15/2020     (H) 05/15/2020     Results for orders placed during the hospital encounter of 07/18/20   Adult Transthoracic Echo Complete W/ Cont if Necessary Per Protocol    Narrative · Estimated EF = 60%.  · Left ventricular systolic function is normal.     Indications  Chest pain    Technically satisfactory study.  Mitral valve is structurally normal.  Tricuspid valve is structurally normal.  Aortic valve is structurally normal.  Pulmonic valve could not be well visualized.  No evidence for mitral tricuspid or aortic regurgitation is seen by   Doppler study.  Left atrium is normal in size.  Right atrium is normal in size.  Left ventricle is normal in size and contractility with ejection fraction   of 60%.  Right ventricle is normal in size.  Atrial septum is intact.  Aorta is normal.  No pericardial effusion or intracardiac thrombus is  seen.    Impression  Structurally and functionally normal cardiac valves.  Left ventricular size and contractility is normal with ejection fraction   of 60%             Assessment/Plan   Edi was seen today for transitional care management and med refill.    Diagnoses and all orders for this visit:    Ischemic heart disease due to coronary artery obstruction (CMS/Formerly [facility])  Comments:  BP goal less than 140/90.  LDL goal less than 100.  Continue dual antiplatelet therapy, beta-blocker, and statin.  Orders:  -     simvastatin (Zocor) 40 MG tablet; Take 1 tablet by mouth Every Night.  -     atenolol (TENORMIN) 25 MG tablet; Take 1 tablet by mouth Daily.    Diet-controlled diabetes mellitus (CMS/[facility])  Comments:  HbA1c goal <7%.  Encouraged low-carbohydrate diet & 150 minutes of exercise weekly.  Diabetic eye exam and foot exam are due.    Hospital discharge follow-up      Follow Up  Return in about 6 months (around 1/27/2021) for Medicare Wellness.         Signature    Pilar Carroll MD  Family Medicine  [facility]        This document has been electronically signed by Pilar Carroll MD on July 27, 2020 12:06

## 2020-09-14 ENCOUNTER — OFFICE VISIT (OUTPATIENT)
Dept: CARDIOLOGY | Facility: CLINIC | Age: 70
End: 2020-09-14

## 2020-09-14 VITALS
SYSTOLIC BLOOD PRESSURE: 124 MMHG | BODY MASS INDEX: 28.95 KG/M2 | DIASTOLIC BLOOD PRESSURE: 79 MMHG | OXYGEN SATURATION: 96 % | WEIGHT: 213.75 LBS | HEART RATE: 58 BPM | HEIGHT: 72 IN

## 2020-09-14 DIAGNOSIS — E78.2 MIXED HYPERLIPIDEMIA: ICD-10-CM

## 2020-09-14 DIAGNOSIS — Z95.5 HX OF HEART ARTERY STENT: ICD-10-CM

## 2020-09-14 DIAGNOSIS — Z95.820 STATUS POST ANGIOPLASTY WITH STENT: Primary | ICD-10-CM

## 2020-09-14 PROCEDURE — 99213 OFFICE O/P EST LOW 20 MIN: CPT | Performed by: INTERNAL MEDICINE

## 2020-09-14 NOTE — PROGRESS NOTES
Encounter Date:09/14/2020  Last seen 7/19/2020      Patient ID: Edi Hager is a 70 y.o. male.    Chief Complaint:  Recent hospitalization follow-up  Status post stenting  Dyslipidemia      History of Present Illness  Patient recently was admitted to [facility] with chest pain which was atypical.  Patient had cardiac catheterization on 5/18/2020.    Patient was discharged home.    Patient is planning on moving to [place] to reside over there.    Since I have last seen, the patient has been without any chest discomfort ,shortness of breath, palpitations, dizziness or syncope.  Denies having any headache ,abdominal pain ,nausea, vomiting , diarrhea constipation, loss of weight or loss of appetite.  Denies having any excessive bruising ,hematuria or blood in the stool.    Review of all systems negative except as indicated    Assessment and Plan       ////////////////////////  Impression  ===========   -status post myocardial infarction  1999 and stent placement to circumflex coronary artery in 1999 (in [place]) .   last cardiac catheterization 2011 revealed patent circumflex stent.  Totally occluded mid LAD with collateral filling of the distal LAD    Stress Cardiolite test 5/14/2020 revealed significant inferior apical and posterior lateral ischemia with reproducible chest discomfort with exercise    Cardiac catheterization 5/18/2020  Left ventricle size and contractility normal with ejection fraction of 60%.  Left main coronary artery normal.  Left anterior descending artery has diffuse calcification..  Mid to distal segment of the left into descending artery has diffuse 99% disease.  Please note LAD was totally occluded with collateral filling in 2011.  Distal LAD near the apex is totally occluded  Circumflex coronary artery showed luminal irregularities.  Right coronary artery has diffuse calcification.  Left ventricle branch and PDA has 50 to 60% proximal disease.     Patient had totally  occluded left anterior descending artery in 2011.  Patient now has diffuse disease in the LAD in the midsegment.  Consideration was given for possible intervention however the benefit of intervention was thought to be less and also in-stent restenosis and occlusion rate probably is higher.  For this reason medical therapy is being considered.     Stress Cardiolite test showed predictable  apical and distal inferior ischemic changes with excellent exercise tolerance.  May 7, 2019      -dyslipidemia      -history of the elevated blood sugars with normal hemoglobin A1c      -status post rotator cuff surgery and double hernia repair      -former smoker  ===========   Plan  =============  Patient is not having any angina pectoris or congestive heart failure.  Patient is planning on moving to [place] to reside over there.  medications were reviewed and updated.  Conservative treatment at this time  If patient has continued symptoms in addition of Ranexa could be considered.  Follow-up as needed-patient is moving to [place]  Further plan will depend on patient's progress.  ////////////////////////            Diagnosis Plan   1. Status post angioplasty with stent     2. Hx of heart artery stent     3. Mixed hyperlipidemia     LAB RESULTS (LAST 7 DAYS)    CBC        BMP        CMP         BNP        TROPONIN        CoAg        Creatinine Clearance  CrCl cannot be calculated (Patient's most recent lab result is older than the maximum 30 days allowed.).    ABG        Radiology  No radiology results for the last day                The following portions of the patient's history were reviewed and updated as appropriate: allergies, current medications, past family history, past medical history, past social history, past surgical history and problem list.    Review of Systems   Constitution: Negative for malaise/fatigue.   Cardiovascular: Negative for chest pain, leg swelling, palpitations and syncope.   Respiratory: Negative for  shortness of breath.    Skin: Negative for rash.   Gastrointestinal: Negative for nausea and vomiting.   Neurological: Negative for dizziness, light-headedness and numbness.         Current Outpatient Medications:   •  aspirin (ASPIR) 81 MG EC tablet, Take 81 mg by mouth Daily., Disp: , Rfl:   •  atenolol (TENORMIN) 25 MG tablet, Take 1 tablet by mouth Daily., Disp: 90 tablet, Rfl: 1  •  cetirizine (ZYRTEC ALLERGY) 10 MG tablet, Daily., Disp: , Rfl:   •  clopidogrel (PLAVIX) 75 MG tablet, Take 75 mg by mouth Every Morning., Disp: , Rfl:   •  isosorbide mononitrate (IMDUR) 60 MG 24 hr tablet, Take 60 mg by mouth Every Morning., Disp: , Rfl:   •  nitroglycerin (NITROSTAT) 0.4 MG SL tablet, Place 1 tablet under the tongue Every 5 (Five) Minutes As Needed for Chest Pain (Only if SBP Greater Than 100). Take no more than 3 doses in 15 minutes., Disp: 30 tablet, Rfl: 12  •  simvastatin (Zocor) 40 MG tablet, Take 1 tablet by mouth Every Night., Disp: 90 tablet, Rfl: 1  •  vitamin C (ASCORBIC ACID) 500 MG tablet, Take 500 mg by mouth Daily., Disp: , Rfl:     No Known Allergies    Family History   Problem Relation Age of Onset   • Hypertension Mother    • Diabetes Mother        Past Surgical History:   Procedure Laterality Date   • CARDIAC CATHETERIZATION  20111   • CARDIAC CATHETERIZATION N/A 5/18/2020    Procedure: Left Heart Cath with Coronary Angiography;  Surgeon: Virginie Arauz MD;  Location: [facility] CATH INVASIVE LOCATION;  Service: Cardiovascular;  Laterality: N/A;   • CARDIAC CATHETERIZATION N/A 5/18/2020    Procedure: Left ventriculography;  Surgeon: Virginie Arauz MD;  Location: [facility] CATH INVASIVE LOCATION;  Service: Cardiovascular;  Laterality: N/A;   • CORONARY ANGIOPLASTY WITH STENT PLACEMENT  1999   • HERNIA REPAIR      Hernia surgery (Two surgery)   • ROTATOR CUFF REPAIR Right        Past Medical History:   Diagnosis Date   • Coronary artery disease    • Gout    • Hx of colonoscopy 2012    NML   •  "Hyperglycemia    • Hyperlipidemia    • MI (myocardial infarction) (CMS/HCC)     15 years ago        Family History   Problem Relation Age of Onset   • Hypertension Mother    • Diabetes Mother        Social History     Socioeconomic History   • Marital status:      Spouse name: Not on file   • Number of children: Not on file   • Years of education: Not on file   • Highest education level: Not on file   Tobacco Use   • Smoking status: Former Smoker     Quit date:      Years since quittin.7   • Smokeless tobacco: Never Used   Substance and Sexual Activity   • Alcohol use: Yes     Alcohol/week: 14.0 standard drinks     Types: 14 Glasses of wine per week     Comment: 2 glasses of wine a night    • Drug use: No   • Sexual activity: Defer         Procedures      Objective:       Physical Exam    /79   Pulse 58   Ht 182.9 cm (72\")   Wt 97 kg (213 lb 12 oz)   SpO2 96%   BMI 28.99 kg/m²   The patient is alert, oriented and in no distress.    Vital signs as noted above.    Head and neck revealed no carotid bruits or jugular venous distension.  No thyromegaly or lymphadenopathy is present.    Lungs clear.  No wheezing.  Breath sounds are normal bilaterally.    Heart normal first and second heart sounds.  No murmur..  No pericardial rub is present.  No gallop is present.    Abdomen soft and nontender.  No organomegaly is present.    Extremities revealed good peripheral pulses without any pedal edema.    Skin warm and dry.    Musculoskeletal system is grossly normal.    CNS grossly normal.    No change compared to last visit.        "

## 2020-09-16 ENCOUNTER — HOSPITAL ENCOUNTER (EMERGENCY)
Facility: HOSPITAL | Age: 70
Discharge: HOME | End: 2020-09-16
Attending: EMERGENCY MEDICINE | Admitting: EMERGENCY MEDICINE

## 2020-09-16 ENCOUNTER — APPOINTMENT (OUTPATIENT)
Dept: CT IMAGING | Facility: HOSPITAL | Age: 70
End: 2020-09-16

## 2020-09-16 VITALS
HEART RATE: 70 BPM | TEMPERATURE: 98.3 F | DIASTOLIC BLOOD PRESSURE: 81 MMHG | RESPIRATION RATE: 19 BRPM | HEIGHT: 72 IN | BODY MASS INDEX: 29.08 KG/M2 | WEIGHT: 214.73 LBS | SYSTOLIC BLOOD PRESSURE: 135 MMHG | OXYGEN SATURATION: 99 %

## 2020-09-16 DIAGNOSIS — M43.6 TORTICOLLIS: Primary | ICD-10-CM

## 2020-09-16 PROCEDURE — 72125 CT NECK SPINE W/O DYE: CPT

## 2020-09-16 PROCEDURE — 99283 EMERGENCY DEPT VISIT LOW MDM: CPT

## 2020-09-16 RX ORDER — CYCLOBENZAPRINE HCL 10 MG
10 TABLET ORAL 3 TIMES DAILY PRN
Qty: 20 TABLET | Refills: 0 | Status: SHIPPED | OUTPATIENT
Start: 2020-09-16 | End: 2020-09-18

## 2020-09-16 RX ORDER — HYDROCODONE BITARTRATE AND ACETAMINOPHEN 5; 325 MG/1; MG/1
1 TABLET ORAL 4 TIMES DAILY PRN
Qty: 15 TABLET | Refills: 0 | Status: SHIPPED | OUTPATIENT
Start: 2020-09-16

## 2020-09-16 NOTE — ED PROVIDER NOTES
Subjective   70-year-old male with moderate left lateral neck pain, worse with movement, no known injury.  Patient has done some lifting but there is been no blunt trauma.  No fever, no chest symptoms, no difficulty swallowing, no sore throat.          Review of Systems   Musculoskeletal: Positive for neck pain.   All other systems reviewed and are negative.      Past Medical History:   Diagnosis Date   • Coronary artery disease    • Gout    • Hx of colonoscopy     NML   • Hyperglycemia    • Hyperlipidemia    • MI (myocardial infarction) (CMS/HCC)     15 years ago        No Known Allergies    Past Surgical History:   Procedure Laterality Date   • CARDIAC CATHETERIZATION     • CARDIAC CATHETERIZATION N/A 2020    Procedure: Left Heart Cath with Coronary Angiography;  Surgeon: Virginie Arauz MD;  Location: [facility] CATH INVASIVE LOCATION;  Service: Cardiovascular;  Laterality: N/A;   • CARDIAC CATHETERIZATION N/A 2020    Procedure: Left ventriculography;  Surgeon: Virginie Arauz MD;  Location: [facility] CATH INVASIVE LOCATION;  Service: Cardiovascular;  Laterality: N/A;   • CORONARY ANGIOPLASTY WITH STENT PLACEMENT     • HERNIA REPAIR      Hernia surgery (Two surgery)   • ROTATOR CUFF REPAIR Right        Family History   Problem Relation Age of Onset   • Hypertension Mother    • Diabetes Mother        Social History     Socioeconomic History   • Marital status:      Spouse name: Not on file   • Number of children: Not on file   • Years of education: Not on file   • Highest education level: Not on file   Tobacco Use   • Smoking status: Former Smoker     Quit date:      Years since quittin.7   • Smokeless tobacco: Never Used   Substance and Sexual Activity   • Alcohol use: Yes     Alcohol/week: 14.0 standard drinks     Types: 14 Glasses of wine per week     Comment: 2 glasses of wine a night    • Drug use: No   • Sexual activity: Defer           Objective   Physical  Exam  Constitutional:       Appearance: Normal appearance.   HENT:      Head: Normocephalic and atraumatic.      Mouth/Throat:      Mouth: Mucous membranes are moist.      Pharynx: Oropharynx is clear.   Eyes:      Conjunctiva/sclera: Conjunctivae normal.      Pupils: Pupils are equal, round, and reactive to light.   Neck:      Comments: Normal inspection, mild left lateral neck tenderness to palpation, decreased range of motion to the left secondary to pain, normal carotid arteries bilaterally  Cardiovascular:      Rate and Rhythm: Normal rate and regular rhythm.      Pulses: Normal pulses.      Heart sounds: Normal heart sounds.   Pulmonary:      Effort: Pulmonary effort is normal.      Breath sounds: Normal breath sounds.   Musculoskeletal: Normal range of motion.         General: No swelling or tenderness.   Skin:     General: Skin is warm and dry.      Capillary Refill: Capillary refill takes less than 2 seconds.   Neurological:      Mental Status: He is alert and oriented to person, place, and time.      Sensory: No sensory deficit.      Motor: No weakness.   Psychiatric:         Mood and Affect: Mood normal.         Behavior: Behavior normal.         Procedures           ED Course                                           MDM  Number of Diagnoses or Management Options  Torticollis:   Diagnosis management comments: Ct Cervical Spine Without Contrast    Result Date: 9/16/2020  1. No acute cervical spine fracture or malalignment is identified. 2. Mild upper cervical lordotic reversal. Multilevel degenerative changes, with varying degrees of canal and foraminal stenosis. 3. 1.7 x 1.6 x 2.2 cm right submandibular calcification, likely a large sialolith. Seferino Bagley M.D. Neuroradiologist  Electronically signed by:  Seferino Bagley M.D.  9/16/2020 3:24 AM    Patient made aware of arthritic changes on CT and the incidental salivary gland stone.  Inspect reviewed.  Patient understands to take the Flexeril and  hydrocodone with caution not to take them both at the same time.       Amount and/or Complexity of Data Reviewed  Tests in the radiology section of CPT®: reviewed        Final diagnoses:   Torticollis            Kenn Molina MD  09/16/20 0619

## 2020-09-18 ENCOUNTER — TELEPHONE (OUTPATIENT)
Dept: FAMILY MEDICINE CLINIC | Facility: CLINIC | Age: 70
End: 2020-09-18

## 2020-09-18 ENCOUNTER — OFFICE VISIT (OUTPATIENT)
Dept: FAMILY MEDICINE CLINIC | Facility: CLINIC | Age: 70
End: 2020-09-18

## 2020-09-18 VITALS
DIASTOLIC BLOOD PRESSURE: 73 MMHG | SYSTOLIC BLOOD PRESSURE: 98 MMHG | TEMPERATURE: 97.5 F | WEIGHT: 211 LBS | OXYGEN SATURATION: 97 % | HEART RATE: 105 BPM | BODY MASS INDEX: 28.62 KG/M2

## 2020-09-18 DIAGNOSIS — M54.31 SCIATICA OF RIGHT SIDE: ICD-10-CM

## 2020-09-18 DIAGNOSIS — M43.6 TORTICOLLIS, ACUTE: Primary | ICD-10-CM

## 2020-09-18 PROCEDURE — 96372 THER/PROPH/DIAG INJ SC/IM: CPT | Performed by: PHYSICIAN ASSISTANT

## 2020-09-18 PROCEDURE — 99496 TRANSJ CARE MGMT HIGH F2F 7D: CPT | Performed by: PHYSICIAN ASSISTANT

## 2020-09-18 RX ORDER — PREDNISONE 10 MG/1
TABLET ORAL
Qty: 20 TABLET | Refills: 0 | Status: SHIPPED | OUTPATIENT
Start: 2020-09-18

## 2020-09-18 RX ORDER — METHOCARBAMOL 500 MG/1
500 TABLET, FILM COATED ORAL 4 TIMES DAILY
Qty: 30 TABLET | Refills: 1 | Status: SHIPPED | OUTPATIENT
Start: 2020-09-18

## 2020-09-18 RX ORDER — METHYLPREDNISOLONE ACETATE 80 MG/ML
80 INJECTION, SUSPENSION INTRA-ARTICULAR; INTRALESIONAL; INTRAMUSCULAR; SOFT TISSUE ONCE
Status: COMPLETED | OUTPATIENT
Start: 2020-09-18 | End: 2020-09-18

## 2020-09-18 RX ADMIN — METHYLPREDNISOLONE ACETATE 80 MG: 80 INJECTION, SUSPENSION INTRA-ARTICULAR; INTRALESIONAL; INTRAMUSCULAR; SOFT TISSUE at 11:37

## 2020-09-18 NOTE — TELEPHONE ENCOUNTER
Please call the patient and let him know that the massage therapists I recommended are booked, however they recommended he reach out to [facility] massage, they may have some availability sooner.

## 2020-09-18 NOTE — PROGRESS NOTES
Transitional Care Follow Up Visit  Subjective     Edi Hager is a 70 y.o. male who presents for a transitional care management visit.    Within 48 business hours after discharge our office contacted him via telephone to coordinate his care and needs.      I reviewed and discussed the details of that call along with the discharge summary, hospital problems, inpatient lab results, inpatient diagnostic studies, and consultation reports with Edi.     Current outpatient and discharge medications have been reconciled for the patient.  Reviewed by: ISA Arreaga      Date of TCM Phone Call 7/19/2020   [facility]   Date of Admission 7/18/2020   Date of Discharge 7/19/2020   Discharge Disposition Home or Self Care     Risk for Readmission (LACE) No data recorded    History of Present Illness   Course During Hospital Stay: Patient is a 70-year-old white male here to follow-up from the hospital where he was seen on 9/16 for left-sided neck pain.  He is evaluated, a cervical spine x-ray was performed and demonstrated no acute abnormality, multilevel degenerative changes were appreciated with foraminal stenosis, and an incidental salivary gland stone was found.  He was diagnosed with torticollis and discharged with hydrocodone and Flexeril.    Today pt reports R sided neck pain and R sided sciatica.  He reports that the right-sided sciatica pain started today, he slept on couch last night, which is new for him.  He states the Norco and Flexeril have not improved his pain, he has been very sedated with the medications but has been sleeping quite a bit.  He denies fevers, chills, feeling ill.  He states him and his wife are moving to [place] within the next several weeks and have been moving boxes and lifting more than usual.     The following portions of the patient's history were reviewed and updated as appropriate: allergies, current medications, past family history, past medical history, past social  history, past surgical history and problem list.    Review of Systems   Constitutional: Negative for fatigue and fever.   Respiratory: Negative for shortness of breath.    Cardiovascular: Negative for chest pain.   Gastrointestinal: Negative for abdominal pain, nausea and vomiting.   Genitourinary: Negative for difficulty urinating, dysuria and flank pain.        Denies bowel or bladder incontinence.   Musculoskeletal: Positive for back pain and neck pain.   Neurological: Negative for numbness.       Objective   Physical Exam  Musculoskeletal:      Cervical back: He exhibits decreased range of motion, tenderness, pain and spasm. He exhibits no bony tenderness, no swelling, no edema, no deformity and no laceration.      Lumbar back: He exhibits tenderness and spasm. He exhibits normal range of motion, no bony tenderness, no swelling, no edema, no deformity, no laceration and no pain.        Back:       Comments: Lower extremity strength 5 out of 5 in all directions, no weakness or deficit noted.  Patellar reflexes 2+ bilaterally.         Assessment/Plan   Edi was seen today for transitional care management.    Diagnoses and all orders for this visit:    Torticollis, acute  Comments:  Not improved, changing Flexeril to Robaxin due to sedation, steroid injection given today and steroid taper given orally.  Discussed meningitis signs and symptoms, ER precautions strictly discussed with patient and his wife, they both verbalized understanding.  I see no concern for meningitis today, however due to his symptoms I ensured that he was aware of the symptoms and ER precautions.  I recommended massage and acupuncture to help relieve the pain as well.  Orders:  -     methylPREDNISolone acetate (DEPO-medrol) injection 80 mg    Sciatica of right side  Comments:  New, changing Flexeril to Robaxin due to sedation, steroid injection given today and steroid taper given orally.  Orders:  -     methylPREDNISolone acetate  (DEPO-medrol) injection 80 mg    Other orders  -     methocarbamol (Robaxin) 500 MG tablet; Take 1 tablet by mouth 4 (Four) Times a Day.  -     predniSONE (DELTASONE) 10 MG tablet; 4 tabs daily x 2 days then 3 tabs daily x 2 days then 2 tabs daily x 2 days then 1 tab daily x 2 days

## (undated) DEVICE — PK TRY HEART CATH 50

## (undated) DEVICE — CATH DIAG IMPULSE FL4 5F 100CM

## (undated) DEVICE — CATH DIAG IMPULSE FR4 5F 100CM

## (undated) DEVICE — CATH DIAG IMPULSE PIG 5F 100CM

## (undated) DEVICE — PINNACLE INTRODUCER SHEATH: Brand: PINNACLE

## (undated) DEVICE — SKIN PREP TRAY W/CHG: Brand: MEDLINE INDUSTRIES, INC.

## (undated) DEVICE — GW DIAG EMERALD HEPCOAT MOVE JTIP STD .035 3MM 150CM